# Patient Record
Sex: MALE | Race: WHITE | Employment: FULL TIME | RURAL
[De-identification: names, ages, dates, MRNs, and addresses within clinical notes are randomized per-mention and may not be internally consistent; named-entity substitution may affect disease eponyms.]

---

## 2019-10-15 ENCOUNTER — OFFICE VISIT (OUTPATIENT)
Dept: FAMILY MEDICINE CLINIC | Age: 69
End: 2019-10-15

## 2019-10-15 VITALS
SYSTOLIC BLOOD PRESSURE: 124 MMHG | RESPIRATION RATE: 16 BRPM | OXYGEN SATURATION: 99 % | HEIGHT: 72 IN | DIASTOLIC BLOOD PRESSURE: 63 MMHG | WEIGHT: 189 LBS | TEMPERATURE: 97.8 F | BODY MASS INDEX: 25.6 KG/M2 | HEART RATE: 64 BPM

## 2019-10-15 DIAGNOSIS — E03.9 ACQUIRED HYPOTHYROIDISM: ICD-10-CM

## 2019-10-15 DIAGNOSIS — Z28.21 INFLUENZA VACCINATION DECLINED: ICD-10-CM

## 2019-10-15 DIAGNOSIS — M25.511 CHRONIC RIGHT SHOULDER PAIN: ICD-10-CM

## 2019-10-15 DIAGNOSIS — E78.5 HYPERLIPIDEMIA, UNSPECIFIED HYPERLIPIDEMIA TYPE: ICD-10-CM

## 2019-10-15 DIAGNOSIS — H52.209 ASTIGMATISM, UNSPECIFIED LATERALITY, UNSPECIFIED TYPE: ICD-10-CM

## 2019-10-15 DIAGNOSIS — Z76.89 ENCOUNTER TO ESTABLISH CARE: ICD-10-CM

## 2019-10-15 DIAGNOSIS — R25.2 CRAMP OF BOTH LOWER EXTREMITIES: Primary | ICD-10-CM

## 2019-10-15 DIAGNOSIS — Z12.11 COLON CANCER SCREENING: ICD-10-CM

## 2019-10-15 DIAGNOSIS — G89.29 CHRONIC RIGHT SHOULDER PAIN: ICD-10-CM

## 2019-10-15 RX ORDER — LEVOTHYROXINE SODIUM 100 UG/1
100 TABLET ORAL
Refills: 0 | COMMUNITY
Start: 2019-08-19 | End: 2020-03-27 | Stop reason: SDUPTHER

## 2019-10-15 RX ORDER — DICLOFENAC SODIUM 10 MG/G
2 GEL TOPICAL 4 TIMES DAILY
Qty: 1 EACH | Refills: 1 | Status: SHIPPED | OUTPATIENT
Start: 2019-10-15 | End: 2020-12-08 | Stop reason: SDUPTHER

## 2019-10-15 NOTE — PROGRESS NOTES
Progress Note    Patient: Bridger Sharp MRN: <[id number]>  SSN: [id number]    YOB: 1950  Age: 81 y.o. Sex: male        Chief Complaint   Patient presents with    New Patient    Other     referral for lasik surgery         Subjective:     Problems addressed:  Encounter Diagnoses     ICD-10-CM ICD-9-CM   1. Cramp of both lower extremities R25.2 729.82   2. Chronic right shoulder pain M25.511 719.41    G89.29 338.29   3. Acquired hypothyroidism E03.9 244.9   4. Astigmatism, unspecified laterality, unspecified type H52.209 367.20   5. Hyperlipidemia, unspecified hyperlipidemia type E78.5 272.4   6. Encounter to establish care Z76.89 V65.8   7. Colon cancer screening Z12.11 V76.51   8. Influenza vaccination declined Z28.21 V64.06     80 y/o M presents to establish care. Recently moved here from [place] and got new insurance, signed medical records release form, will request prior records. Pt requests referral to eye doctor for Lasix due to astigmatism. Pt also states \"I'm old and wearing out\", complains of cramps in feet and calves over past few weeks which is a new problem, has not happened before, is drinking a lot of water and taking otc potassium without relief. Has chronic R shoulder pain without recent injury, has tried diclofenac gel in the past which helps, is using CBD for shoulder pain which made a little difference, works for [employer] and requests letter stating why he is taking CBD.      SH:   Social History     Tobacco Use    Smoking status: Former Smoker     Packs/day: 1.00     Years: 6.00     Pack years: 6.00     Types: Cigarettes    Smokeless tobacco: Never Used   Substance Use Topics    Alcohol use: Not Currently    Drug use: Not on file     FH:   Family History   Problem Relation Age of Onset    Alcohol abuse Mother     Dementia Mother     Suicide Father     Other Brother         rnp1 protein disorder with elevated inflamatory markers         Current and past medical information:    Current Medications after this visit[de-identified]     Current Outpatient Medications   Medication Sig    levothyroxine (SYNTHROID) 100 mcg tablet 100 mcg.  OTHER Hammer hemp 0.0%THC    diclofenac (VOLTAREN) 1 % gel Apply 2 g to affected area four (4) times daily. No current facility-administered medications for this visit. Patient Active Problem List    Diagnosis Date Noted    Astigmatism 10/17/2019    Hyperlipidemia 10/15/2019    Acquired hypothyroidism 10/15/2019    Chronic right shoulder pain 10/15/2019       Past Medical History:   Diagnosis Date    Depression     Hypercholesterolemia     Skin cancer     Thyroid disease        Allergies   Allergen Reactions    Atorvastatin Myalgia       Past Surgical History:   Procedure Laterality Date    HX HEENT  1998    subcutaneous cyst removed from head    HX HEENT  1999    skin cancer removed from face    HX TONSIL AND ADENOIDECTOMY               Review of Systems   Constitutional: Negative for chills and fever. HENT: Negative for congestion and sore throat. Eyes: Negative for blurred vision and double vision. Respiratory: Negative for cough and shortness of breath. Cardiovascular: Negative for chest pain. Gastrointestinal: Negative for abdominal pain, constipation, diarrhea, nausea and vomiting. Genitourinary: Negative for flank pain. Musculoskeletal: Positive for joint pain (R shoulder). Negative for back pain. Bilateral lower leg \"cramps\"   Skin: Negative for rash. Neurological: Negative for dizziness, tingling, sensory change and headaches. Endo/Heme/Allergies: Negative for environmental allergies. Psychiatric/Behavioral: Negative for substance abuse. Objective:     Vitals:    10/15/19 0819   BP: 124/63   Pulse: 64   Resp: 16   Temp: 97.8 °F (36.6 °C)   TempSrc: Oral   SpO2: 99%   Weight: 189 lb (85.7 kg)   Height: 5' 11.5\" (1.816 m)      Body mass index is 25.99 kg/m².       Physical Exam Constitutional: He appears well-developed and well-nourished. No distress. HENT:   Head: Normocephalic and atraumatic. Right Ear: External ear normal.   Left Ear: External ear normal.   Mouth/Throat: Oropharynx is clear and moist. No oropharyngeal exudate. Eyes: Pupils are equal, round, and reactive to light. Conjunctivae and EOM are normal.   Neck: Normal range of motion. Neck supple. Cardiovascular: Normal rate, regular rhythm and normal heart sounds. No murmur heard. Pulmonary/Chest: Effort normal and breath sounds normal. No respiratory distress. He has no wheezes. Abdominal: Soft. Bowel sounds are normal. He exhibits no distension. There is no tenderness. Musculoskeletal: He exhibits no edema or deformity. Right shoulder: He exhibits normal range of motion, no swelling, no deformity and normal strength. Lymphadenopathy:     He has no cervical adenopathy. Neurological: He is alert. Skin: Skin is warm and dry. No rash noted. He is not diaphoretic. No erythema. Psychiatric: He has a normal mood and affect. His behavior is normal.   Vitals reviewed. Health Maintenance Due   Topic Date Due    Hepatitis C Screening  1950    DTaP/Tdap/Td series (1 - Tdap) 01/23/1973    Shingrix Vaccine Age 50> (1 of 2) 01/23/2002    FOBT Q 1 YEAR AGE 50-75  01/23/2002    GLAUCOMA SCREENING Q2Y  01/23/2017    AAA Screening 73-67 YO Male Smoking Patients  01/23/2017    Pneumococcal 65+ years (1 of 2 - PCV13) 01/23/2017    Influenza Age 9 to Adult  08/01/2019       Assessment and orders:     Encounter Diagnoses     ICD-10-CM ICD-9-CM   1. Cramp of both lower extremities R25.2 729.82   2. Chronic right shoulder pain M25.511 719.41    G89.29 338.29   3. Acquired hypothyroidism E03.9 244.9   4. Astigmatism, unspecified laterality, unspecified type H52.209 367.20   5. Hyperlipidemia, unspecified hyperlipidemia type E78.5 272.4   6. Encounter to establish care Z76.89 V65.8   7.  Colon cancer screening Z12.11 V76.51   8. Influenza vaccination declined Z35.24 V64.06       82 y/o M establishing care with feet and calve cramping over past few weeks and multiple other complaints    1. Cramp of both lower extremities - new onset over a few weeks, pt drinking a lot of water and taking otc potassium supplement, brother has rnp1 protein disorder? No LE swelling or tenderness to palpation  - SED RATE (ESR)  - CRP, HIGH SENSITIVITY  - CMP  - Continue good PO hydration    2. Chronic right shoulder pain   - START diclofenac (VOLTAREN) 1 % gel; Apply 2 g to affected area four (4) times daily. Dispense: 1 Each; Refill: 1    3. Acquired hypothyroidism   - TSH 3RD GENERATION    4. Astigmatism   - REFERRAL TO OPHTHALMOLOGY    5. Hyperlipidemia, unspecified hyperlipidemia type - pt not on any medications at this time, requested prior records  -Lipid panel    6. Colon cancer screening  - pt is 81 and has never had a colonoscopy, says wife convinced him to get one  - REFERRAL TO GASTROENTEROLOGY    8. Influenza vaccination declined - pt says he doesn't believe in the flu or shingles vaccines           Plan of care:  Discussed diagnoses in detail with patient. Medication risks/benefits/side effects discussed with patient. All of the patient's questions were addressed. The patient understands and agrees with our plan of care. The patient knows to call back if they are unsure of or forget any changes we discussed today or if the symptoms change. The patient received an After-Visit Summary which contains VS, orders, medication list and allergy list. This can be used as a \"mini-medical record\" should they have to seek medical care while out of town. Follow-up and Dispositions    · Return in about 1 week (around 10/22/2019) for Lab Results, shoulder pain.          Future Appointments   Date Time Provider Shereen Chatman   10/29/2019  8:10 AM [facility]       Signed By: Marizol Mathews Casper Fields MD     October 17, 2019

## 2019-10-15 NOTE — PROGRESS NOTES
I saw and evaluated the patient, performing the key elements of the service. I discussed the findings, assessment and plan with the resident and agree with the resident's findings and plan as documented in the resident's note. Pt reports his brother is a physician and has MP1 protein disorder and recommended pt be screened with ESR and CRP.

## 2019-10-15 NOTE — PATIENT INSTRUCTIONS
Shoulder Stretches: Exercises  Introduction  Here are some examples of exercises for you to try. The exercises may be suggested for a condition or for rehabilitation. Start each exercise slowly. Ease off the exercises if you start to have pain. You will be told when to start these exercises and which ones will work best for you. How to do the exercises  Shoulder stretch    1.  a doorway and place one arm against the door frame. Your elbow should be a little higher than your shoulder. 2. Relax your shoulders as you lean forward, allowing your chest and shoulder muscles to stretch. You can also turn your body slightly away from your arm to stretch the muscles even more. 3. Hold for 15 to 30 seconds. 4. Repeat 2 to 4 times with each arm. Shoulder and chest stretch    1. Shoulder and chest stretch  2. While sitting, relax your upper body so you slump slightly in your chair. 3. As you breathe in, straighten your back and open your arms out to the sides. 4. Gently pull your shoulder blades back and downward. 5. Hold for 15 to 30 seconds as your breathe normally. 6. Repeat 2 to 4 times. Overhead stretch    1. Reach up over your head with both arms. 2. Hold for 15 to 30 seconds. 3. Repeat 2 to 4 times. Follow-up care is a key part of your treatment and safety. Be sure to make and go to all appointments, and call your doctor if you are having problems. It's also a good idea to know your test results and keep a list of the medicines you take. Where can you learn more? Go to http://frances-orestes.info/. Enter S254 in the search box to learn more about \"Shoulder Stretches: Exercises. \"  Current as of: June 26, 2019  Content Version: 12.2  © 2927-4073 [employer]. Care instructions adapted under license by [employer] (which disclaims liability or warranty for this information).  If you have questions about a medical condition or this instruction, always ask your healthcare professional. [place] [address] any warranty or liability for your use of this information.

## 2019-10-15 NOTE — LETTER
10/15/2019 9:55 AM 
 
Mr. Dalton CARLOS [address] [address] Patient is taking CBD for arthritis/joint pain which has helped to relieve his pain. Sincerely, Nisha Cazares MD

## 2019-10-15 NOTE — PROGRESS NOTES
1. Have you been to the ER, urgent care clinic, or been hospitalized since your last visit? No     2. Have you seen or consulted any other health care providers outside of the [address] [place] [address] since your last visit? No     Reviewed record in preparation for visit and have necessary documentation  Goals that were addressed and/or need to be completed during or after this appointment include   There are no preventive care reminders to display for this patient. I have received verbal consent from Segundo Upton to discuss any/all medical information while others present in the room.

## 2019-10-16 LAB
ALBUMIN SERPL-MCNC: 4.6 G/DL (ref 3.6–4.8)
ALBUMIN/GLOB SERPL: 2.1 {RATIO} (ref 1.2–2.2)
ALP SERPL-CCNC: 49 IU/L (ref 39–117)
ALT SERPL-CCNC: 20 IU/L (ref 0–44)
AST SERPL-CCNC: 26 IU/L (ref 0–40)
BASOPHILS # BLD AUTO: 0.1 X10E3/UL (ref 0–0.2)
BASOPHILS NFR BLD AUTO: 1 %
BILIRUB SERPL-MCNC: 0.5 MG/DL (ref 0–1.2)
BUN SERPL-MCNC: 20 MG/DL (ref 8–27)
BUN/CREAT SERPL: 21 (ref 10–24)
CALCIUM SERPL-MCNC: 9.4 MG/DL (ref 8.6–10.2)
CHLORIDE SERPL-SCNC: 101 MMOL/L (ref 96–106)
CHOLEST SERPL-MCNC: 257 MG/DL (ref 100–199)
CO2 SERPL-SCNC: 22 MMOL/L (ref 20–29)
CREAT SERPL-MCNC: 0.97 MG/DL (ref 0.76–1.27)
CRP SERPL HS-MCNC: 1.1 MG/L (ref 0–3)
EOSINOPHIL # BLD AUTO: 0.2 X10E3/UL (ref 0–0.4)
EOSINOPHIL NFR BLD AUTO: 2 %
ERYTHROCYTE [DISTWIDTH] IN BLOOD BY AUTOMATED COUNT: 12.7 % (ref 12.3–15.4)
ERYTHROCYTE [SEDIMENTATION RATE] IN BLOOD BY WESTERGREN METHOD: 2 MM/HR (ref 0–30)
GLOBULIN SER CALC-MCNC: 2.2 G/DL (ref 1.5–4.5)
GLUCOSE SERPL-MCNC: 94 MG/DL (ref 65–99)
HCT VFR BLD AUTO: 43.5 % (ref 37.5–51)
HDLC SERPL-MCNC: 49 MG/DL
HGB BLD-MCNC: 14.2 G/DL (ref 13–17.7)
IMM GRANULOCYTES # BLD AUTO: 0 X10E3/UL (ref 0–0.1)
IMM GRANULOCYTES NFR BLD AUTO: 0 %
LDLC SERPL CALC-MCNC: 182 MG/DL (ref 0–99)
LYMPHOCYTES # BLD AUTO: 1.8 X10E3/UL (ref 0.7–3.1)
LYMPHOCYTES NFR BLD AUTO: 27 %
MCH RBC QN AUTO: 30 PG (ref 26.6–33)
MCHC RBC AUTO-ENTMCNC: 32.6 G/DL (ref 31.5–35.7)
MCV RBC AUTO: 92 FL (ref 79–97)
MONOCYTES # BLD AUTO: 0.5 X10E3/UL (ref 0.1–0.9)
MONOCYTES NFR BLD AUTO: 8 %
NEUTROPHILS # BLD AUTO: 4.2 X10E3/UL (ref 1.4–7)
NEUTROPHILS NFR BLD AUTO: 62 %
PLATELET # BLD AUTO: 251 X10E3/UL (ref 150–450)
POTASSIUM SERPL-SCNC: 5 MMOL/L (ref 3.5–5.2)
PROT SERPL-MCNC: 6.8 G/DL (ref 6–8.5)
RBC # BLD AUTO: 4.73 X10E6/UL (ref 4.14–5.8)
SODIUM SERPL-SCNC: 140 MMOL/L (ref 134–144)
TRIGL SERPL-MCNC: 131 MG/DL (ref 0–149)
TSH SERPL DL<=0.005 MIU/L-ACNC: 1.01 UIU/ML (ref 0.45–4.5)
VLDLC SERPL CALC-MCNC: 26 MG/DL (ref 5–40)
WBC # BLD AUTO: 6.7 X10E3/UL (ref 3.4–10.8)

## 2019-10-17 PROBLEM — H52.209 ASTIGMATISM: Status: ACTIVE | Noted: 2019-10-17

## 2019-10-25 NOTE — PROGRESS NOTES
Results noted. , ASCVD risk score 16.4. Not currently on treatment for HLD. Pt had myalgias with atorvastatin. Will contact patient and see if he is willing to try rosuvastatin.  ESR, CRP, and TSH normal.     Nathaniel Mooney MD  PGY2 Family Medicine Resident

## 2019-10-26 ENCOUNTER — TELEPHONE (OUTPATIENT)
Dept: FAMILY MEDICINE CLINIC | Age: 69
End: 2019-10-26

## 2019-10-26 NOTE — TELEPHONE ENCOUNTER
Attempted to call patient about lab results. No answer. All labs normal except for high cholesterol. Due to patient's high ASCVD risk score of 16.4, high intensity statin is recommended. Will discuss options with patient at his clinic visit in 3 days on 10/29/19.      Bob Garces MD  4:10 PM

## 2019-10-29 ENCOUNTER — OFFICE VISIT (OUTPATIENT)
Dept: FAMILY MEDICINE CLINIC | Age: 69
End: 2019-10-29

## 2019-10-29 VITALS
RESPIRATION RATE: 16 BRPM | WEIGHT: 192 LBS | DIASTOLIC BLOOD PRESSURE: 77 MMHG | BODY MASS INDEX: 26.01 KG/M2 | TEMPERATURE: 97.6 F | SYSTOLIC BLOOD PRESSURE: 125 MMHG | HEART RATE: 67 BPM | HEIGHT: 72 IN | OXYGEN SATURATION: 98 %

## 2019-10-29 DIAGNOSIS — Z87.891 HISTORY OF SMOKING: ICD-10-CM

## 2019-10-29 DIAGNOSIS — E03.9 ACQUIRED HYPOTHYROIDISM: ICD-10-CM

## 2019-10-29 DIAGNOSIS — Z11.59 NEED FOR HEPATITIS C SCREENING TEST: ICD-10-CM

## 2019-10-29 DIAGNOSIS — G89.29 CHRONIC RIGHT SHOULDER PAIN: ICD-10-CM

## 2019-10-29 DIAGNOSIS — Z28.21 INFLUENZA VACCINATION DECLINED: ICD-10-CM

## 2019-10-29 DIAGNOSIS — Z13.6 ENCOUNTER FOR ABDOMINAL AORTIC ANEURYSM (AAA) SCREENING: ICD-10-CM

## 2019-10-29 DIAGNOSIS — E78.5 HYPERLIPIDEMIA, UNSPECIFIED HYPERLIPIDEMIA TYPE: Primary | ICD-10-CM

## 2019-10-29 DIAGNOSIS — M25.511 CHRONIC RIGHT SHOULDER PAIN: ICD-10-CM

## 2019-10-29 DIAGNOSIS — Z28.21 PNEUMOCOCCAL VACCINATION DECLINED: ICD-10-CM

## 2019-10-29 RX ORDER — EZETIMIBE 10 MG/1
10 TABLET ORAL DAILY
Qty: 30 TAB | Refills: 2 | Status: SHIPPED | OUTPATIENT
Start: 2019-10-29 | End: 2020-09-09

## 2019-10-29 RX ORDER — ROSUVASTATIN CALCIUM 10 MG/1
10 TABLET, COATED ORAL
Qty: 30 TAB | Refills: 0 | Status: CANCELLED | OUTPATIENT
Start: 2019-10-29

## 2019-10-29 NOTE — PROGRESS NOTES
Progress Note    Patient: Margarita Gonzales MRN: [id number]  SSN: [id number]    YOB: 1950  Age: 81 y.o. Sex: male        Chief Complaint   Patient presents with    Results     lab result follow up         Subjective:     Problems addressed:  Encounter Diagnoses     ICD-10-CM ICD-9-CM   1. Hyperlipidemia, unspecified hyperlipidemia type E78.5 272.4   2. Chronic right shoulder pain M25.511 719.41    G89.29 338.29   3. Acquired hypothyroidism E03.9 244.9   4. History of smoking Z87. 891 V15.82   5. Need for hepatitis C screening test Z11.59 V73.89   6. Influenza vaccination declined Z28.21 V64.06   7. Pneumococcal vaccination declined Z28.21 V64.06   8. Encounter for abdominal aortic aneurysm (AAA) screening Z13.6 V81.2       80 y/o M presents for follow up of lab results and shoulder pain. ASCVD risk score of 16.4 with LDL of 182. Otherwise labs looked good, thyroid nl function, ESR and CRP nl. Pt has tried 3-4 statins in the past and had myalgias with each, declines to try any statin again. Says was on Zetia in the past and did ok with that. He also says he is planning to try keto soon to lose some weight. Shoulder pain is improved with diclofenac gel and described as \"managable\". Pt has no other complaints at this time. Current and past medical information:    Current Medications after this visit[de-identified]     Current Outpatient Medications   Medication Sig    ezetimibe (ZETIA) 10 mg tablet Take 1 Tab by mouth daily.  levothyroxine (SYNTHROID) 100 mcg tablet 100 mcg.  diclofenac (VOLTAREN) 1 % gel Apply 2 g to affected area four (4) times daily.  OTHER Hammer hemp 0.0%THC     No current facility-administered medications for this visit.         Patient Active Problem List    Diagnosis Date Noted    Former smoker 10/29/2019    Astigmatism 10/17/2019    Hyperlipidemia 10/15/2019    Acquired hypothyroidism 10/15/2019    Chronic right shoulder pain 10/15/2019       Past Medical History:   Diagnosis Date    Depression     Hypercholesterolemia     Skin cancer     Thyroid disease        Allergies   Allergen Reactions    Atorvastatin Myalgia    Crestor [Rosuvastatin] Myalgia       Past Surgical History:   Procedure Laterality Date    HX HEENT  1998    subcutaneous cyst removed from head    HX HEENT  1999    skin cancer removed from face    HX TONSIL AND ADENOIDECTOMY         Social History     Tobacco Use    Smoking status: Former Smoker     Packs/day: 1.00     Years: 6.00     Pack years: 6.00     Types: Cigarettes    Smokeless tobacco: Never Used   Substance Use Topics    Alcohol use: Not Currently    Drug use: Not on file         Review of Systems   Constitutional: Negative for chills and fever. Respiratory: Negative for cough and shortness of breath. Cardiovascular: Negative for chest pain and palpitations. Musculoskeletal: Positive for joint pain (R shoulder). Negative for falls. Neurological: Negative for sensory change. Objective:     Vitals:    10/29/19 0814   BP: 125/77   Pulse: 67   Resp: 16   Temp: 97.6 °F (36.4 °C)   TempSrc: Oral   SpO2: 98%   Weight: 192 lb (87.1 kg)   Height: 5' 11.5\" (1.816 m)      Body mass index is 26.41 kg/m². Physical Exam   Constitutional: He appears well-developed and well-nourished. No distress. HENT:   Head: Normocephalic and atraumatic. Right Ear: External ear normal.   Left Ear: External ear normal.   Eyes: Pupils are equal, round, and reactive to light. Conjunctivae are normal.   Neck: Neck supple. No thyromegaly present. Cardiovascular: Normal rate, regular rhythm and normal heart sounds. No murmur heard. Pulmonary/Chest: Effort normal and breath sounds normal. No respiratory distress. He has no wheezes. Musculoskeletal: He exhibits no edema or deformity. Neurological: He is alert. Skin: Skin is warm and dry. He is not diaphoretic. No erythema. Psychiatric: He has a normal mood and affect.  His behavior is normal. Vitals reviewed. Health Maintenance Due   Topic Date Due    DTaP/Tdap/Td series (1 - Tdap) 01/23/1973    Shingrix Vaccine Age 50> (1 of 2) 01/23/2002    FOBT Q 1 YEAR AGE 50-75  01/23/2002    GLAUCOMA SCREENING Q2Y  01/23/2017    AAA Screening 73-69 YO Male Smoking Patients  01/23/2017    Pneumococcal 65+ years (1 of 2 - PCV13) 01/23/2017    Influenza Age 9 to Adult  08/01/2019    MEDICARE YEARLY EXAM  10/30/2019       Assessment and orders:     Encounter Diagnoses     ICD-10-CM ICD-9-CM   1. Hyperlipidemia, unspecified hyperlipidemia type E78.5 272.4   2. Chronic right shoulder pain M25.511 719.41    G89.29 338.29   3. Acquired hypothyroidism E03.9 244.9   4. History of smoking Z87. 891 V15.82   5. Need for hepatitis C screening test Z11.59 V73.89   6. Influenza vaccination declined Z28.21 V64.06   7. Pneumococcal vaccination declined Z28.21 V64.06   8. Encounter for abdominal aortic aneurysm (AAA) screening Z13.6 V81.2       80 y/o M with hyperlipidemia and statin allergy, ok to start Zetia    1. Hyperlipidemia, unspecified hyperlipidemia type - CYX119, pt allergic to statins, has tried 3-4 different ones in the past  -START ezetimibe (ZETIA) 10 mg tablet; Take 1 Tab by mouth daily. Dispense: 30 Tab; Refill: 2  -Encouraging healthy diet and exercise    2. Chronic right shoulder pain - improved and \"manageable\" with diclofenac gel  -Continue diclofenac gel PRN as needed for pain    3. Acquired hypothyroidism - TSH 1.010  -Continue synthroid 100 mcg daily    4. History of smoking - about 6 pack years  - US EXAM SCREENING AAA; Future    5. Need for hepatitis C screening test - born   - HEPATITIS C AB    6. Influenza vaccination declined    7. Pneumococcal vaccination declined          Plan of care:  Discussed diagnoses in detail with patient. Medication risks/benefits/side effects discussed with patient. All of the patient's questions were addressed.  The patient understands and agrees with our plan of care. The patient knows to call back if they are unsure of or forget any changes we discussed today or if the symptoms change. The patient received an After-Visit Summary which contains VS, orders, medication list and allergy list. This can be used as a \"mini-medical record\" should they have to seek medical care while out of town. Follow-up and Dispositions    · Return in about 3 months (around 1/29/2020) for Chronic Medical Problems. No future appointments.     Signed By: Christopher Dasilva MD     October 29, 2019

## 2019-10-29 NOTE — PROGRESS NOTES
1. Have you been to the ER, urgent care clinic, or been hospitalized since your last visit? No     2. Have you seen or consulted any other health care providers outside of the [address] [place] [address] since your last visit?   no    Reviewed record in preparation for visit and have necessary documentation  Goals that were addressed and/or need to be completed during or after this appointment include   Health Maintenance Due   Topic Date Due    Hepatitis C Screening  1950    DTaP/Tdap/Td series (1 - Tdap) 01/23/1973    Shingrix Vaccine Age 50> (1 of 2) 01/23/2002    FOBT Q 1 YEAR AGE 50-75  01/23/2002    GLAUCOMA SCREENING Q2Y  01/23/2017    AAA Screening 73-67 YO Male Smoking Patients  01/23/2017    Pneumococcal 65+ years (1 of 2 - PCV13) 01/23/2017    Influenza Age 5 to Adult  08/01/2019       I have received verbal consent from Elzie Crigler to discuss any/all medical information while others present in the room.

## 2019-10-29 NOTE — PATIENT INSTRUCTIONS
An appointment has been made for you to see Dr. Karlo Warner, eye specialist, on November 5, 2019 at 10:15 with an arrival time of 10:00. The office is located at [address] [place] [address] [place], . [address] and the number to the office is [phone]. Letter to patient and records sent electronically. Information has been faxed to [facility] for their office to contact the patient to schedule an appointment. The phone number to the office is [phone]. Letter to patient. High Cholesterol: Care Instructions  Your Care Instructions    Cholesterol is a type of fat in your blood. It is needed for many body functions, such as making new cells. Cholesterol is made by your body. It also comes from food you eat. High cholesterol means that you have too much of the fat in your blood. This raises your risk of a heart attack and stroke. LDL and HDL are part of your total cholesterol. LDL is the \"bad\" cholesterol. High LDL can raise your risk for heart disease, heart attack, and stroke. HDL is the \"good\" cholesterol. It helps clear bad cholesterol from the body. High HDL is linked with a lower risk of heart disease, heart attack, and stroke. Your cholesterol levels help your doctor find out your risk for having a heart attack or stroke. You and your doctor can talk about whether you need to lower your risk and what treatment is best for you. A heart-healthy lifestyle along with medicines can help lower your cholesterol and your risk. The way you choose to lower your risk will depend on how high your risk is for heart attack and stroke. It will also depend on how you feel about taking medicines. Follow-up care is a key part of your treatment and safety. Be sure to make and go to all appointments, and call your doctor if you are having problems. It's also a good idea to know your test results and keep a list of the medicines you take. How can you care for yourself at home?   · Eat a variety of foods every day. Good choices include fruits, vegetables, whole grains (like oatmeal), dried beans and peas, nuts and seeds, soy products (like tofu), and fat-free or low-fat dairy products. · Replace butter, margarine, and hydrogenated or partially hydrogenated oils with olive and canola oils. (Canola oil margarine without trans fat is fine.)  · Replace red meat with fish, poultry, and soy protein (like tofu). · Limit processed and packaged foods like chips, crackers, and cookies. · Bake, broil, or steam foods. Don't lind them. · Be physically active. Get at least 30 minutes of exercise on most days of the week. Walking is a good choice. You also may want to do other activities, such as running, swimming, cycling, or playing tennis or team sports. · Stay at a healthy weight or lose weight by making the changes in eating and physical activity listed above. Losing just a small amount of weight, even 5 to 10 pounds, can reduce your risk for having a heart attack or stroke. · Do not smoke. When should you call for help? Watch closely for changes in your health, and be sure to contact your doctor if:    · You need help making lifestyle changes.     · You have questions about your medicine. Where can you learn more? Go to http://frances-orestes.info/. Enter N610 in the search box to learn more about \"High Cholesterol: Care Instructions. \"  Current as of: April 9, 2019  Content Version: 12.2  © 9598-7195 [employer]. Care instructions adapted under license by [employer] (which disclaims liability or warranty for this information). If you have questions about a medical condition or this instruction, always ask your healthcare professional. [address] any warranty or liability for your use of this information.

## 2019-10-30 LAB — HCV AB S/CO SERPL IA: <0.1 S/CO RATIO (ref 0–0.9)

## 2019-10-30 NOTE — PROGRESS NOTES
Results noted. No further actions required.      Melissa Goldstein MD  PGY2 Family Medicine Resident

## 2019-11-06 ENCOUNTER — TELEPHONE (OUTPATIENT)
Dept: FAMILY MEDICINE CLINIC | Age: 69
End: 2019-11-06

## 2020-03-26 ENCOUNTER — OFFICE VISIT (OUTPATIENT)
Dept: FAMILY MEDICINE CLINIC | Age: 70
End: 2020-03-26

## 2020-03-26 VITALS
HEART RATE: 67 BPM | BODY MASS INDEX: 26.68 KG/M2 | HEIGHT: 72 IN | SYSTOLIC BLOOD PRESSURE: 120 MMHG | DIASTOLIC BLOOD PRESSURE: 75 MMHG | RESPIRATION RATE: 20 BRPM | TEMPERATURE: 98.5 F | WEIGHT: 197 LBS | OXYGEN SATURATION: 95 %

## 2020-03-26 DIAGNOSIS — B02.9 HERPES ZOSTER WITHOUT COMPLICATION: Primary | ICD-10-CM

## 2020-03-26 DIAGNOSIS — B07.8 COMMON WART: ICD-10-CM

## 2020-03-26 RX ORDER — FAMCICLOVIR 500 MG/1
500 TABLET ORAL 3 TIMES DAILY
Qty: 21 TAB | Refills: 0 | Status: SHIPPED | OUTPATIENT
Start: 2020-03-26 | End: 2020-04-02

## 2020-03-26 NOTE — PROGRESS NOTES
[facility]    Subjective:   Beatriz Abdalla is a 78 y.o. male with history of hypothyroidism, HLD, former tobacco use  CC: possible shingles and wart  History provided by patient    HPI:  Pt complains of a wart located on his left third finger. It becomes painful if he continues to let it grow. He has had this frozen off three other times. He has had this wart for many years, and it keeps coming back. Pt states that his wife believes that he has shingles. He thought it was a bug bite. The site has been sore for about a week. He then thought he was stung. It is unclear if it is spreading or getting worse. He did not see a bug bite him. It is located on the right side of his back at waist level. It has hurt him to lay down. It is mildly pruritic. PFSH:     Current Outpatient Medications on File Prior to Visit   Medication Sig Dispense Refill    OTHER Hammer hemp 0.0%THC      ezetimibe (ZETIA) 10 mg tablet Take 1 Tab by mouth daily. 30 Tab 2    levothyroxine (SYNTHROID) 100 mcg tablet 100 mcg.  0    diclofenac (VOLTAREN) 1 % gel Apply 2 g to affected area four (4) times daily. 1 Each 1     No current facility-administered medications on file prior to visit.         Patient Active Problem List   Diagnosis Code    Hyperlipidemia E78.5    Acquired hypothyroidism E03.9    Chronic right shoulder pain M25.511, G89.29    Astigmatism H52.209    Former smoker Z87.891       Social History     Socioeconomic History    Marital status:      Spouse name: Not on file    Number of children: Not on file    Years of education: Not on file    Highest education level: Not on file   Occupational History    Not on file   Social Needs    Financial resource strain: Not on file    Food insecurity     Worry: Not on file     Inability: Not on file    Transportation needs     Medical: Not on file     Non-medical: Not on file   Tobacco Use    Smoking status: Former Smoker     Packs/day: 1.00 Years: 6.00     Pack years: 6.00     Types: Cigarettes    Smokeless tobacco: Never Used   Substance and Sexual Activity    Alcohol use: Not Currently    Drug use: Not on file    Sexual activity: Yes     Partners: Female   Lifestyle    Physical activity     Days per week: Not on file     Minutes per session: Not on file    Stress: Not on file   Relationships    Social connections     Talks on phone: Not on file     Gets together: Not on file     Attends Evangelical service: Not on file     Active member of club or organization: Not on file     Attends meetings of clubs or organizations: Not on file     Relationship status: Not on file    Intimate partner violence     Fear of current or ex partner: Not on file     Emotionally abused: Not on file     Physically abused: Not on file     Forced sexual activity: Not on file   Other Topics Concern    Not on file   Social History Narrative    Not on file       Review of Systems   Constitutional: Negative for chills and fever. Skin: Positive for itching and rash. Burning sensation. Positive for wart   Neurological: Positive for tingling. Objective:     Visit Vitals  /75 (BP 1 Location: Right arm, BP Patient Position: Sitting)   Pulse 67   Temp 98.5 °F (36.9 °C) (Oral)   Resp 20   Ht 5' 11.5\" (1.816 m)   Wt 197 lb (89.4 kg)   SpO2 95%   BMI 27.09 kg/m²          Physical Exam  Constitutional:       Appearance: Normal appearance. HENT:      Head: Normocephalic and atraumatic. Skin:     General: Skin is warm. Findings: Rash present. Comments: Cluster of vesicles, not yet ruptured in dermatomal pattern on right side around level of L4. Wart located on R third, dorsal finger immediately proximal to cuticle. Neurological:      Mental Status: He is alert.        Clinic Procedure Note:    Procedure performed: Cryotherapy of common wart     Indications: Irritating wart/Cosmetic    Date of procedure: 03/27/20    Chart reviewed for the following:              Joan Sheth MD, have reviewed the History, Physical and updated the Allergic reactions for [address] performed immediately prior to start of procedure:              Joan Sheth MD, have performed the following reviews on [facility] prior to the start of the procedure, see attached form in media. Procedure:  After consent was obtained, a common wart was identified for destruction. Lesion was treated to 4 rounds of cryotherapy, both with five seconds of treatment. Total number of lesions treated:  one. Locations of Treatment performed: periungal region of right, third finger  The procedure was well tolerated. The patient is advised that typically noting swelling and mild discoloration of the lesions over the course of 24-48 hours initially and nadia the lesions should eventually darken and fall off over the course of 7-14 days. A small amount of bleeding can be normal.    Advised if develops drainage, worse selling, new swelling after original swelling or other significant abnormalities to call or return to office immediatly. Procedure performed by:  Abril Daley MD  Provider assisted by: Julianne Barrios  Patient assisted by: self     How tolerated by patient: tolerated the procedure well with no complications    Comments: none      Pertinent Labs/Studies: none      Assessment and orders:       ICD-10-CM ICD-9-CM    1. Herpes zoster without complication S02.5 418.3    2. Common wart B07.8 078.19 DESTRUC BENIGN LESION, UP TO 14 LESIONS     Encounter Diagnoses   Name Primary?  Herpes zoster without complication Yes    Common wart      Diagnoses and all orders for this visit:    1. Herpes zoster without complication - pt has not had shingles vaccine. Lesions are characteristic of shingles and located in one dermatome on R-sided back around L4. Vesicles have not yet ruptured as there is no crusting over the lesions.  Rash present for more than 72 hours, but as new lesions are appearing, and they have not crusted, pt will likely get some benefit from taking antiviral.  -     famciclovir (FAMVIR) 500 mg tablet; Take 1 Tab by mouth three (3) times daily for 7 days.  -     Recommended pt to get shingles vaccine about 2-3 months after this rash resolves  -     Asked pt to call if his symptoms become severe or if his pain is not managed by tylenol or motrin    2. Common wart - pt has had common wart that has recurred on his R third distal phalanx. He has already had 3 cryotherapy treatments. Gave additional round of cryotherapy given its recurrence. See procedure note above. -     DESTRUC BENIGN LESION, UP TO 14 LESIONS        Follow-up and Dispositions    · Return if symptoms worsen or fail to improve. I have discussed the diagnosis with the patient and the intended plan as seen in the above orders. Social history, medical history, and labs were reviewed. The patient has received an after-visit summary and questions were answered concerning future plans. I have discussed medication side effects and warnings with the patient as well.     Maribell Cunningham MD  Resident [facility]  03/26/20

## 2020-03-26 NOTE — PATIENT INSTRUCTIONS

## 2020-03-26 NOTE — PROGRESS NOTES
I discussed the findings, assessment and plan in detail with the resident and agree with the resident's findings and plan as documented in the resident's note. Deniz Delvalle M.D.

## 2020-03-26 NOTE — PROGRESS NOTES
1. Have you been to the ER, urgent care clinic since your last visit? Hospitalized since your last visit? No    2. Have you seen or consulted any other health care providers outside of the [address] [place] [address] since your last visit? Include any pap smears or colon screening. No    Reviewed record in preparation for visit and have necessary documentation  Goals that were addressed and/or need to be completed during or after this appointment include     Health Maintenance Due   Topic Date Due    DTaP/Tdap/Td series (1 - Tdap) 01/23/1973    Shingrix Vaccine Age 50> (1 of 2) 01/23/2002    FOBT Q1Y Age 54-65  01/23/2002    GLAUCOMA SCREENING Q2Y  01/23/2017    AAA Screening 73-67 YO Male Smoking Patients  01/23/2017    Pneumococcal 65+ years (1 of 1 - PPSV23) 01/23/2017    Influenza Age 5 to Adult  08/01/2019    Medicare Yearly Exam  10/30/2019       Patient is accompanied by self I have received verbal consent from Ag Leslie to discuss any/all medical information while they are present in the room.

## 2020-03-27 ENCOUNTER — PATIENT MESSAGE (OUTPATIENT)
Dept: FAMILY MEDICINE CLINIC | Age: 70
End: 2020-03-27

## 2020-03-27 DIAGNOSIS — E03.9 ACQUIRED HYPOTHYROIDISM: Primary | ICD-10-CM

## 2020-03-27 RX ORDER — LEVOTHYROXINE SODIUM 100 UG/1
100 TABLET ORAL
Qty: 90 TAB | Refills: 1 | Status: SHIPPED | OUTPATIENT
Start: 2020-03-27 | End: 2020-03-27 | Stop reason: ALTCHOICE

## 2020-03-27 NOTE — TELEPHONE ENCOUNTER
From: Kamini Boogie  To: Kailey Self MD  Sent: 3/27/2020 12:51 PM EDT  Subject: Prescription Question    Dr Woo:  If possible would prefer Arnett Thyroid instead of the levrothyroxine. My ORIGINAL Rx was for Arnett Thyroid, and levrothyroxine was substituted.     Thanks  Avni Dawn

## 2020-03-27 NOTE — TELEPHONE ENCOUNTER
Sent a replacement prescription for [place]om Thyroid for the patient, but I do not know if insurance will cover or not.      Adela Mtz MD  1:21 PM

## 2020-04-23 DIAGNOSIS — E03.9 ACQUIRED HYPOTHYROIDISM: ICD-10-CM

## 2020-04-23 RX ORDER — THYROID,PORK 97.5 MG
TABLET ORAL
Qty: 30 TAB | Refills: 0 | Status: SHIPPED | OUTPATIENT
Start: 2020-04-23 | End: 2020-07-06

## 2020-04-24 ENCOUNTER — PATIENT MESSAGE (OUTPATIENT)
Dept: FAMILY MEDICINE CLINIC | Age: 70
End: 2020-04-24

## 2020-04-24 DIAGNOSIS — D22.9 ATYPICAL MOLE: ICD-10-CM

## 2020-04-24 DIAGNOSIS — Z85.828 HISTORY OF SKIN CANCER: Primary | ICD-10-CM

## 2020-04-24 NOTE — TELEPHONE ENCOUNTER
Marybeth Sykes, MAR [birth year] 3:06 PM EDT      ----- Message -----  From: Yara Rader  Sent: 4/24/2020 12:24 PM EDT  To: [facility] Nurse Pool  Subject: Visit Follow-Up Question     Thank you for prompt treatment. Shingles appears to have run it's course & cleared up.  -  Next issue is \"suspicious moles\" on my back. I do have a history of skin cancer. When might I get this checked out & removed. -  Can we schedule an early AM appointment.  Thanks  Odette Agustin

## 2020-06-25 ENCOUNTER — TELEPHONE (OUTPATIENT)
Dept: FAMILY MEDICINE CLINIC | Age: 70
End: 2020-06-25

## 2020-06-25 NOTE — TELEPHONE ENCOUNTER
----- Message from Eric Dos Rios sent at 6/25/2020  9:22 AM EDT -----  Regarding: Dr. Siomara Mccullough: [phone]  Caller's first and last name: N/A  Reason for call:  Pt is requesting in office visit, addressing several issues: f/up, lyme disease check up has had tick bites, does not feel well, allergies, sinus issues, swollen glands. Callback required yes/no and why: Yes, to inform.    Best contact number(s): [phone]  Details to clarify the request: N/A

## 2020-06-25 NOTE — TELEPHONE ENCOUNTER
Attempted to call. No answer. Message left. If patient is having cold/allergy symptoms/ cough, we cannot see patient in office.  Virtual appointment must be made

## 2020-09-04 ENCOUNTER — TELEPHONE (OUTPATIENT)
Dept: FAMILY MEDICINE CLINIC | Age: 70
End: 2020-09-04

## 2020-09-04 NOTE — TELEPHONE ENCOUNTER
Called pt to schedule VV w/ provider and pt can discuss any concerns during VV regarding coming into office for an appt. No answer. Left message to call back.

## 2020-09-08 ENCOUNTER — OFFICE VISIT (OUTPATIENT)
Dept: FAMILY MEDICINE CLINIC | Age: 70
End: 2020-09-08
Payer: COMMERCIAL

## 2020-09-08 VITALS
OXYGEN SATURATION: 98 % | DIASTOLIC BLOOD PRESSURE: 71 MMHG | HEIGHT: 72 IN | SYSTOLIC BLOOD PRESSURE: 121 MMHG | RESPIRATION RATE: 16 BRPM | BODY MASS INDEX: 26.14 KG/M2 | WEIGHT: 193 LBS | HEART RATE: 63 BPM | TEMPERATURE: 97.2 F

## 2020-09-08 DIAGNOSIS — Z12.11 COLON CANCER SCREENING: ICD-10-CM

## 2020-09-08 DIAGNOSIS — E78.5 HYPERLIPIDEMIA, UNSPECIFIED HYPERLIPIDEMIA TYPE: Primary | ICD-10-CM

## 2020-09-08 DIAGNOSIS — E03.9 ACQUIRED HYPOTHYROIDISM: ICD-10-CM

## 2020-09-08 DIAGNOSIS — R42 VERTIGO: ICD-10-CM

## 2020-09-08 DIAGNOSIS — B07.8 COMMON WART: ICD-10-CM

## 2020-09-08 DIAGNOSIS — H91.93 BILATERAL HEARING LOSS, UNSPECIFIED HEARING LOSS TYPE: ICD-10-CM
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
## 2020-09-08 DIAGNOSIS — D22.9 BENIGN MOLE: ICD-10-CM

## 2020-09-08 DIAGNOSIS — R42 DIZZINESS: ICD-10-CM

## 2020-09-08 DIAGNOSIS — Z85.828 HISTORY OF SKIN CANCER: ICD-10-CM

## 2020-09-08 LAB
CHOLEST SERPL-MCNC: 220 MG/DL
HDLC SERPL-MCNC: 56 MG/DL
HDLC SERPL: 3.9 {RATIO} (ref 0–5)
LDLC SERPL CALC-MCNC: 141.2 MG/DL (ref 0–100)
LIPID PROFILE,FLP: ABNORMAL
TRIGL SERPL-MCNC: 114 MG/DL (ref ?–150)
TSH SERPL DL<=0.05 MIU/L-ACNC: 2.24 UIU/ML (ref 0.36–3.74)
VLDLC SERPL CALC-MCNC: 22.8 MG/DL

## 2020-09-08 PROCEDURE — 99214 OFFICE O/P EST MOD 30 MIN: CPT | Performed by: STUDENT IN AN ORGANIZED HEALTH CARE EDUCATION/TRAINING PROGRAM

## 2020-09-08 NOTE — PROGRESS NOTES
[address] Medicine Residency Attending Addendum:  Dr. Colt Vivas MD,  the patient and I were not physically present during this encounter. The resident and I are concurrently monitoring the patient care through appropriate telecommunication technology. I discussed the findings, assessment and plan with the resident and agree with the resident's findings and plan as documented in the resident's note.       Braulio Keith MD

## 2020-09-08 NOTE — PROGRESS NOTES
Progress Note    Patient: Delfino Casey MRN: [id number]  SSN: [id number]    YOB: 1950  Age: 78 y.o. Sex: male        Chief Complaint   Patient presents with    Skin Problem     moles on back and spots on arms    Warts    Labs         Subjective:     Problems addressed:  Encounter Diagnoses     ICD-10-CM ICD-9-CM   1. Acquired hypothyroidism  E03.9 244.9   2. Hyperlipidemia, unspecified hyperlipidemia type  E78.5 272.4   3. Benign mole  D22.9 216.9   4. Tick bite, sequela  W57. XXXS 906.2   5. History of skin cancer  Z85.828 V10.83   6. Common wart  B07.8 078.19   7. Dizziness  R42 780.4   8. Bilateral hearing loss, unspecified hearing loss type  H91.93 389.9   9. Vertigo  R42 780.4   10. Colon cancer screening  Z12.11 V76.51       HPI: 78 y.o. y/o male with PMH of hypothyroidism and HLD presents for multiple complaints. Pt reports multiple tick bites this summer, has noticed a red circular rash about 2-3 cm after one of the bites, but denies target like rash, and requests Lyme testing. Pt reports episode of dizziness about a week ago, went to [facility] ER, had normal basic labs and vitals and was diagnosed with vertigo. Pt denies any dizziness since, but would like ENT referral for vertigo and says his wife thinks he has some hearing loss. Reports a couple moles on his back that he would like a Dermatologist to look at, reports hx of skin cancer on his forehead in the past and wants to establish care with Dermatologist. Pt due for labs. Says he is not taking the Zetia due to occasional muscle cramps. Denies any fever, chills, cough, SOB, sick contacts, or recent travel. Pt requests another referral to GI to get Colonoscopy set up. Pt refuses vaccinations today. Current and past medical information:    Current Medications after this visit[de-identified]     Current Outpatient Medications   Medication Sig    salicylic acid 17 % topical gel Soak wart in warm water for 5 minutes. Dry area thoroughly. Apply 1 drop to cover wart. Let dry. Repeat once or twice daily until wart is removed for up to 12 weeks.  Nature-Throid 97.5 mg tab TAKE ONE TABLET BY MOUTH EVERY DAY    OTHER Hammer hemp 0.0%THC    diclofenac (VOLTAREN) 1 % gel Apply 2 g to affected area four (4) times daily. No current facility-administered medications for this visit. Patient Active Problem List    Diagnosis Date Noted    Former smoker 10/29/2019    Astigmatism 10/17/2019    Hyperlipidemia 10/15/2019    Acquired hypothyroidism 10/15/2019    Chronic right shoulder pain 10/15/2019       Past Medical History:   Diagnosis Date    Depression     Hypercholesterolemia     Skin cancer     Thyroid disease        Allergies   Allergen Reactions    Atorvastatin Myalgia    Crestor [Rosuvastatin] Myalgia       Past Surgical History:   Procedure Laterality Date    HX HEENT  1998    subcutaneous cyst removed from head    HX HEENT  1999    skin cancer removed from face    HX TONSIL AND ADENOIDECTOMY         Social History     Tobacco Use    Smoking status: Former Smoker     Packs/day: 1.00     Years: 6.00     Pack years: 6.00     Types: Cigarettes    Smokeless tobacco: Never Used   Substance Use Topics    Alcohol use: Not Currently    Drug use: Not on file         Review of Systems   Constitutional: Negative for chills and fever. HENT: Negative for congestion and sore throat. Respiratory: Negative for cough and shortness of breath. Gastrointestinal: Negative for abdominal pain, nausea and vomiting. Musculoskeletal: Negative for falls. Neurological: Negative for sensory change, speech change, focal weakness, loss of consciousness, weakness and headaches. Objective:     Vitals:    09/08/20 0856   BP: 121/71   Pulse: 63   Resp: 16   Temp: 97.2 °F (36.2 °C)   TempSrc: Temporal   SpO2: 98%   Weight: 193 lb (87.5 kg)   Height: 5' 11.5\" (1.816 m)      Body mass index is 26.54 kg/m².       Physical Exam  Constitutional: General: He is not in acute distress. Appearance: Normal appearance. He is not diaphoretic. HENT:      Head: Normocephalic and atraumatic. Right Ear: External ear normal.      Left Ear: External ear normal.   Eyes:      Conjunctiva/sclera: Conjunctivae normal.      Pupils: Pupils are equal, round, and reactive to light. Neck:      Musculoskeletal: Neck supple. Cardiovascular:      Rate and Rhythm: Normal rate and regular rhythm. Heart sounds: No murmur. Pulmonary:      Effort: Pulmonary effort is normal. No respiratory distress. Breath sounds: No wheezing or rhonchi. Skin:     General: Skin is warm and dry. Findings: No erythema or rash. Comments: Patient with 3 0.5-1cm moles on his back, all uniform color without ulceration, clear borders, and symmetrical. Also with 1 wart on R 3rd finger. Neurological:      General: No focal deficit present. Mental Status: He is alert. Psychiatric:         Behavior: Behavior normal.            Assessment and orders:     Encounter Diagnoses     ICD-10-CM ICD-9-CM   1. Hyperlipidemia, unspecified hyperlipidemia type  E78.5 272.4   2. Acquired hypothyroidism  E03.9 244.9   3. Tick bite, sequela  W57. XXXS 906.2   4. Benign mole  D22.9 216.9   5. History of skin cancer  Z85.828 V10.83   6. Common wart  B07.8 078.19   7. Dizziness  R42 780.4   8. Vertigo  R42 780.4   9. Bilateral hearing loss, unspecified hearing loss type  H91.93 389.9   10. Colon cancer screening  Z12.11 V76.51       79 y/o M with HLD who does not tolerate statins and due for labs    1. Hyperlipidemia, unspecified hyperlipidemia type - pt now refusing to take Zetia, will check labs and consider Cardiology referral to see if can get patient on something for his elevated cholesterol   - LIPID PANEL; Future    2. Acquired hypothyroidism - Last TSH 1.010 10/15/19  - Continue Nature-Thyroid at 97.5mg daily  - TSH 3RD GENERATION; Future    3.  Tick bite, sequela - reports multiple tick bites this summer with small red rash, not target like rash, ticks not attached for longer than a day, denies any neurological symptoms or joint pain, requesting lyme disease testing  - LYME AB TOTAL W/RFLX W BLOT; Future    4. Benign mole with hx of skin cancer -   - REFERRAL TO DERMATOLOGY    5. Common wart - failed cryotherapy treatment, pt not trying anything over the counter, will try salicylic acid  -START salicylic acid 17 % topical gel; Soak wart in warm water for 5 minutes. Dry area thoroughly. Apply 1 drop to cover wart. Let dry. Repeat once or twice daily until wart is removed for up to 12 weeks. Dispense: 1 Tube; Refill: 1    6. Dizziness and Vertigo and suspicion of bilateral hearing loss - single episode about a week ago, went to the ER and was told everything was fine and it was vertigo, was prescribed meclizine, has not taken because has been symptom free since, requests ENT referral for work up and wants hearing loss testing   - REFERRAL TO ENT-OTOLARYNGOLOGY    7. Colon cancer screening  - REFERRAL TO GASTROENTEROLOGY        Plan of care:  Discussed diagnoses in detail with patient. Medication risks/benefits/side effects discussed with patient. All of the patient's questions were addressed. The patient understands and agrees with our plan of care. The patient knows to call back if they are unsure of or forget any changes we discussed today or if the symptoms change. The patient received an After-Visit Summary which contains VS, orders, medication list and allergy list. This can be used as a \"mini-medical record\" should they have to seek medical care while out of town. Follow-up and Dispositions    · Return in about 1 month (around 10/8/2020) for Medicare wellness visit. No future appointments.     Signed By: Chely Llanos MD     September 8, 2020

## 2020-09-08 NOTE — PATIENT INSTRUCTIONS
Epley Maneuver at Home for Vertigo: Exercises  Introduction  Vertigo is a spinning or whirling sensation when you move your head. Your doctor may have moved you in different positions to help your vertigo get better faster. This is called the Epley maneuver. Your doctor also may have asked you to do these exercises at home. Do the exercises as often as your doctor recommends. If your vertigo is getting worse, your doctor may have you change the exercise or stop it. Step 1  Step 1   1. Sit on the edge of a bed or sofa. Step 2   1. Turn your head 45 degrees in the direction your doctor told you to. This should be toward the ear that causes the most vertigo for you. In this picture, the woman is turning toward her left ear. Step 3   1. Tilt yourself backward until you are lying on your back. Your head should still be at a 45-degree turn. Your head should be about midway between looking straight ahead and looking out to your side. Hold for 30 seconds. If you have vertigo, stay in this position until it stops. Step 4   1. Turn your head 90 degrees toward the ear that has the least vertigo. In this picture, the woman is turning to the right because she has vertigo on her left side. The point of your chin should be raised and over your shoulder. Hold for 30 seconds. Step 5   1. Roll onto the side with the least vertigo. You should now be looking at the floor. Hold for 30 seconds. Follow-up care is a key part of your treatment and safety. Be sure to make and go to all appointments, and call your doctor if you are having problems. It's also a good idea to know your test results and keep a list of the medicines you take. Where can you learn more? Go to http://frances-orestes.info/  Enter P834 in the search box to learn more about \"Epley Maneuver at Home for Vertigo: Exercises. \"  Current as of: November 20, 2019               Content Version: 12.6  © 5346-6648 Healthwise, Incorporated. Care instructions adapted under license by [employer] (which disclaims liability or warranty for this information). If you have questions about a medical condition or this instruction, always ask your healthcare professional. [address] any warranty or liability for your use of this information. Tick Bite: Care Instructions  Your Care Instructions     Ticks are small spiderlike animals. They bite to fasten themselves onto your skin and feed on your blood. Ticks can carry diseases. But most ticks do not carry diseases, and most tick bites do not cause serious health problems. Some people may have an allergic reaction to a tick bite. This reaction may be mild, with symptoms like itching and swelling. In rare cases, a severe allergic reaction may occur. Most of the time, all you need to do for a tick bite is relieve any symptoms you may have. Follow-up care is a key part of your treatment and safety. Be sure to make and go to all appointments, and call your doctor if you are having problems. It's also a good idea to know your test results and keep a list of the medicines you take. How can you care for yourself at home? · Put ice or a cold pack on the bite for 15 to 20 minutes once an hour. Put a thin cloth between the ice and your skin. · Try an over-the-counter medicine to relieve itching, redness, swelling, and pain. Be safe with medicines. Read and follow all instructions on the label. ? Take an antihistamine medicine, such as a nondrowsy one like loratadine (Claritin) or one that might make you sleepy like diphenhydramine (Benadryl). These medicines may help relieve itching, redness, and swelling. ? Use a spray of local anesthetic that contains benzocaine, such as Solarcaine. It may help relieve pain. If your skin reacts to the spray, stop using it. ? Put calamine lotion on the skin. It may help relieve itching.   To avoid tick bites  · Avoid ticks:  ? Learn where ticks are found in your community, and stay away from those areas if possible. ? Cover as much of your body as possible when you work or play in grassy or wooded areas. ? Use insect repellents, such as products containing DEET. You can spray them on your skin. ? Take steps to control ticks on your property if you live in an area where Lyme disease occurs. Clear leaves, brush, tall grasses, woodpiles, and stone fences from around your house and the edges of your yard or garden. This may help get rid of ticks. · When you come in from outdoors, check your body for ticks, including your groin, head, and underarms. The ticks may be about the size of a sesame seed. If no one else can help you check for ticks on your scalp, comb your hair with a fine-tooth comb. · If you find a tick, remove it quickly. Use tweezers to grasp the tick as close to its mouth (the part in your skin) as possible. Slowly pull the tick straight out--do not twist or yank--until its mouth releases from your skin. If part of the tick stays in the skin, leave it alone. It will likely come out on its own in a few days. · Ticks can come into your house on clothing, outdoor gear, and pets. These ticks can fall off and attach to you. ? Check your clothing and outdoor gear. Remove any ticks you find. Then put your clothing in a clothes dryer on high heat for 1 hour to kill any ticks that might remain. ? Check your pets for ticks after they have been outdoors. When should you call for help? Call 911 anytime you think you may need emergency care. For example, call if:    · You have symptoms of a severe allergic reaction. These may include:  ? Sudden raised, red areas (hives) all over your body. ? Swelling of the throat, mouth, lips, or tongue. ? Trouble breathing. ? Passing out (losing consciousness). Or you may feel very lightheaded or suddenly feel weak, confused, or restless.    Call your doctor now or seek immediate medical care if:    · You have signs of infection, such as:  ? Increased pain, swelling, warmth, or redness around the bite. ? Red streaks leading from the bite. ? Pus draining from the bite. ? A fever. Watch closely for changes in your health, and be sure to contact your doctor if:    · You develop a new rash.     · You have joint pain.     · You are very tired.     · You have flu-like symptoms.     · You have symptoms for more than 1 week. Where can you learn more? Go to http://frances-orestes.info/  Enter [id number] in the search box to learn more about \"Tick Bite: Care Instructions. \"  Current as of: June 26, 2019               Content Version: 12.6  © 3345-9142 [employer]. Care instructions adapted under license by [employer] (which disclaims liability or warranty for this information). If you have questions about a medical condition or this instruction, always ask your healthcare professional. [place] [address] any warranty or liability for your use of this information.

## 2020-09-08 NOTE — PROGRESS NOTES
1. Have you been to the ER, urgent care clinic, or been hospitalized since your last visit? Vertigo, Urgent care last week    2. Have you seen or consulted any other health care providers outside of the [address] [place] [address] since your last visit? No     Reviewed record in preparation for visit and have necessary documentation  Goals that were addressed and/or need to be completed during or after this appointment include   Health Maintenance Due   Topic Date Due    DTaP/Tdap/Td series (1 - Tdap) 01/23/1973    Shingrix Vaccine Age 50> (1 of 2) 01/23/2002    FOBT Q1Y Age 54-65  01/23/2002    GLAUCOMA SCREENING Q2Y  01/23/2017    AAA Screening 73-69 YO Male Smoking Patients  01/23/2017    Pneumococcal 65+ years (1 of 1 - PPSV23) 01/23/2017    Medicare Yearly Exam  10/30/2019    Flu Vaccine (1) 09/01/2020       I have received verbal consent from Romel Jiménez to discuss any/all medical information while others present in the room.

## 2020-09-10 LAB — B BURGDOR IGM SER IA-ACNC: <0.8 INDEX (ref 0–0.79)

## 2020-09-11 NOTE — PROGRESS NOTES
Results noted. TSH normal. LDL improved, but still high. Pt does not tolerate Statins and or Zetia. Will continue to encourage lifestyle modifications and consider referral to Cardiology if patient would like to pursue other medication options.      Marielos Jon MD  PGY3 Family Medicine Resident

## 2020-10-14 ENCOUNTER — TELEPHONE (OUTPATIENT)
Dept: FAMILY MEDICINE CLINIC | Age: 70
End: 2020-10-14

## 2020-10-14 NOTE — TELEPHONE ENCOUNTER
-- Message is from the [facility]--    Caller is requesting an appointment - at a sooner time than what was available.       PCP unavailable for post hospital follow up    Reason for Visit: New Born 5days old out the hospital and need to follow up with Doctor    Is the patient currently scheduled? No    Caller Information       Type Contact Phone    2019 08:35 AM Phone (Incoming) derrick garcia (Mother) [phone]          Alternative phone number: none    Turnaround time given to caller:   \"This message will be sent to [state Provider's name]. The clinical team will fulfill your request as soon as they review your message.\"   Noted appointment 10-15-20

## 2020-10-14 NOTE — TELEPHONE ENCOUNTER
----- Message from Michelleavani Wang sent at 10/14/2020  1:58 PM EDT -----  Regarding: Deepti Benz MD  Level 1/Escalated Issue      Caller's first and last name and relationship (if not the patient):      Best contact number(s):[phone]      What are the symptoms: Right Knee pain       Transfer successful - yes/no (include outcome): N/A      Transfer declined - yes/no (include reason): N/A      Was caller advised to seek appropriate level of care - yes/no: N/A       Details to clarify the request: appt scheduled 10/15/2020        Anurag Wang

## 2020-10-15 ENCOUNTER — VIRTUAL VISIT (OUTPATIENT)
Dept: FAMILY MEDICINE CLINIC | Age: 70
End: 2020-10-15
Payer: COMMERCIAL

## 2020-10-15 DIAGNOSIS — M25.561 ACUTE PAIN OF RIGHT KNEE: Primary | ICD-10-CM

## 2020-10-15 DIAGNOSIS — M67.449 DIGITAL MUCOUS CYST: ICD-10-CM

## 2020-10-15 DIAGNOSIS — Z85.828 HX OF SKIN CANCER, BASAL CELL: ICD-10-CM

## 2020-10-15 DIAGNOSIS — E03.9 ACQUIRED HYPOTHYROIDISM: ICD-10-CM

## 2020-10-15 PROCEDURE — 99213 OFFICE O/P EST LOW 20 MIN: CPT | Performed by: STUDENT IN AN ORGANIZED HEALTH CARE EDUCATION/TRAINING PROGRAM

## 2020-10-15 RX ORDER — MECLIZINE HYDROCHLORIDE 25 MG/1
TABLET ORAL
COMMUNITY
Start: 2020-08-31 | End: 2022-05-26

## 2020-10-15 RX ORDER — THYROID,PORK 97.5 MG
TABLET ORAL
Qty: 90 TAB | Refills: 1 | Status: SHIPPED | OUTPATIENT
Start: 2020-10-15 | End: 2020-11-09 | Stop reason: SDUPTHER

## 2020-10-15 NOTE — PROGRESS NOTES
Cam Husbands  78 y.o. male  1950  [address]  [facility] [address]  [id number]   [facility]:    Telemedicine Progress Note  Cade Vaughan MD       Encounter Date and Time: October 15, 2020 at 3:34 PM    Consent:  He and/or the health care decision maker is aware that that he may receive a bill for this telephone service, depending on his insurance coverage, and has provided verbal consent to proceed: Yes    Chief Complaint   Patient presents with    Knee Pain     History of Present Illness   Cam Husbands is a 78 y.o. male was evaluated by synchronous (real-time) audio-video technology from home. R knee pain started about 4-5 days ago, pain worse with activity, using otc pain creams, \"dull ache\", \"joint feels lose\". Pain worse with lifting leg than with putting weight on it. Denies any warmth, swelling, or redness of knee. Denies any numbness or tingling or falls or injury or prior surgery to R knee. Did colonoscopy today, needs 10 yr follow up. Saw Dermatologist for skin lesions, had basal cell cancer, cyst in finger, got referral to ortho for cyst.     Denies any fever, chills, cough, SOB, sick contacts, or recent travel. Review of Systems   Review of Systems   Constitutional: Negative for chills and fever. Respiratory: Negative for cough and shortness of breath. Gastrointestinal: Negative for abdominal pain, nausea and vomiting. Musculoskeletal: Positive for joint pain (R knee). Neurological: Negative for dizziness and headaches.        Vitals/Objective:     General: alert, cooperative, no distress   Mental  status: mental status: alert, oriented to person, place, and time, normal mood, behavior, speech, dress, motor activity, and thought processes   Resp: resp: normal effort, no respiratory distress and not coughing   Neuro:  Musc: neuro: no gross deficits   R Knee did not appear to be swollen   Skin: skin: no discoloration or lesions of concern on visible areas   Due to this being a TeleHealth evaluation, many elements of the physical examination are unable to be assessed. Assessment and Plan:   Time-based coding, delete if not needed: I spent at least 15 minutes with this established patient, and >50% of the time was spent counseling and/or coordinating care regarding knee pain    Sheryl Riggs is a 78 y.o. male with acute R knee pain without injury or trauma    1. Acute pain of right knee - no injury, trauma, swelling, erythema, or warmth, likely arthritis vs tendonitis vs meniscal injury, no back pain, trouble walking, numbness, or tingling   -Aleve/Tylenol PRN  -Icing and stretching  -If does not improve or worsens, will bring in to clinic for exam and xray and consider PT referral     2. Acquired hypothyroidism - stable  Lab Results   Component Value Date/Time    TSH 2.24 09/08/2020 09:58 AM     -REFILLED thyroid, pork, (Nature-Throid) 97.5 mg tab; TAKE ONE TABLET BY MOUTH EVERY DAY  Dispense: 90 Tab; Refill: 1    3. Digital mucous cyst - diagnosed by dermatologist recently, records scanned into media tab  -Pending referral to orthopedics for resection    4. Hx of skin cancer, basal cell - recently dermatology clinic note reviewed and scanned into media tab  -F/u with Dermatology as needed who is managing patient's skin problems         Time spent in direct conversation with the patient to include medical condition(s) discussed, assessment and treatment plan:       We discussed the expected course, resolution and complications of the diagnosis(es) in detail. Medication risks, benefits, costs, interactions, and alternatives were discussed as indicated. I advised him to contact the office if his condition worsens, changes or fails to improve as anticipated. He expressed understanding with the diagnosis(es) and plan. Patient understands that this encounter was a temporary measure, and the importance of further follow up and examination was emphasized.   Patient verbalized understanding. Patient informed to follow up: Follow-up and Dispositions  ·   Return in about 5 months (around 3/15/2021), or if symptoms worsen or fail to improve, for TSH recheck . Electronically Signed: Nadeem Klein MD    Providers location when delivering service (clinic, hospital, home): Home      ICD-10-CM ICD-9-CM    1. Acute pain of right knee  M25.561 719.46    2. Acquired hypothyroidism  E03.9 244.9 thyroid, pork, (Nature-Throid) 97.5 mg tab   3. Digital mucous cyst  M67.449 727.43    4. Hx of skin cancer, basal cell  Z85.828 V10.83            Pursuant to the emergency declaration under the [address] [place] [address], [facility] waiver authority and the [employer] Act, this Virtual  Visit was conducted, with patient's consent, to reduce the patient's risk of exposure to COVID-19 and provide continuity of care for an established patient. Services were provided through a video synchronous discussion virtually to substitute for in-person clinic visit. History   Patients past medical, surgical and family histories were reviewed.     Past Medical History:   Diagnosis Date    Depression     Hypercholesterolemia     Skin cancer     Thyroid disease      Past Surgical History:   Procedure Laterality Date    HX HEENT  1998    subcutaneous cyst removed from head    HX HEENT  1999    skin cancer removed from face    HX TONSIL AND ADENOIDECTOMY       Family History   Problem Relation Age of Onset    Alcohol abuse Mother     Dementia Mother     Suicide Father     Other Brother         rnp1 protein disorder with elevated inflamatory markers     Social History     Socioeconomic History    Marital status:      Spouse name: Not on file    Number of children: Not on file    Years of education: Not on file    Highest education level: Not on file   Occupational History    Not on file   Social Needs  Financial resource strain: Not on file   Adam-Adriane insecurity     Worry: Not on file     Inability: Not on file   NJOY needs     Medical: Not on file     Non-medical: Not on file   Tobacco Use    Smoking status: Former Smoker     Packs/day: 1.00     Years: 6.00     Pack years: 6.00     Types: Cigarettes    Smokeless tobacco: Never Used   Substance and Sexual Activity    Alcohol use: Not Currently    Drug use: Not on file    Sexual activity: Yes     Partners: Female   Lifestyle    Physical activity     Days per week: Not on file     Minutes per session: Not on file    Stress: Not on file   Relationships    Social connections     Talks on phone: Not on file     Gets together: Not on file     Attends Tenriism service: Not on file     Active member of club or organization: Not on file     Attends meetings of clubs or organizations: Not on file     Relationship status: Not on file    Intimate partner violence     Fear of current or ex partner: Not on file     Emotionally abused: Not on file     Physically abused: Not on file     Forced sexual activity: Not on file   Other Topics Concern    Not on file   Social History Narrative    Not on file     Patient Active Problem List   Diagnosis Code    Hyperlipidemia E78.5    Acquired hypothyroidism E03.9    Chronic right shoulder pain M25.511, G89.29    Astigmatism H52.209    Former smoker Z87.891    Digital mucous cyst M67.449          Current Medications/Allergies   Medications and Allergies reviewed:    Current Outpatient Medications   Medication Sig Dispense Refill    thyroid, pork, (Nature-Throid) 97.5 mg tab TAKE ONE TABLET BY MOUTH EVERY DAY 90 Tab 1    meclizine (ANTIVERT) 25 mg tablet       salicylic acid 17 % topical gel Soak wart in warm water for 5 minutes. Dry area thoroughly. Apply 1 drop to cover wart. Let dry. Repeat once or twice daily until wart is removed for up to 12 weeks.  1 Tube 1    OTHER Hammer hemp 0.0%THC      diclofenac (VOLTAREN) 1 % gel Apply 2 g to affected area four (4) times daily.  1 Each 1     Allergies   Allergen Reactions    Atorvastatin Myalgia    Crestor [Rosuvastatin] Myalgia    Zetia [Ezetimibe] Myalgia     \"muscle cramps\"

## 2020-10-15 NOTE — PROGRESS NOTES
[address] Medicine Residency Attending Addendum:  Dr. Kevin Rojas MD,  the patient and I were not physically present during this encounter. The resident and I are concurrently monitoring the patient care through appropriate telecommunication technology. I discussed the findings, assessment and plan with the resident and agree with the resident's findings and plan as documented in the resident's note.       Sonja Marcial MD

## 2020-11-06 ENCOUNTER — TELEPHONE (OUTPATIENT)
Dept: FAMILY MEDICINE CLINIC | Age: 70
End: 2020-11-06

## 2020-11-06 DIAGNOSIS — E03.9 ACQUIRED HYPOTHYROIDISM: ICD-10-CM

## 2020-11-06 RX ORDER — THYROID,PORK 97.5 MG
TABLET ORAL
Qty: 90 TAB | Refills: 1 | Status: CANCELLED | OUTPATIENT
Start: 2020-11-06

## 2020-11-06 NOTE — TELEPHONE ENCOUNTER
----- Message from BeauCoo sent at 11/6/2020  8:41 AM EST -----  Regarding: /Telephone  Contact: [phone]  General Message/Vendor Calls    Caller's first and last name:pt      Reason for call:find mail order pharmacy or med substitute      Callback required yes/no and why:yes to confirm that rx can be sent to mail order pharmacy       Best contact number(s):[phone]      Details to clarify the request:Pt says that he is having trouble filling his \"(Nature-Throid) 97.5 mg\" via [facility] and would like to have rx re routed to a mail order pharmacy.  He also wants to follow up with orthopedic MD regarding R knee, please advise      [employer]

## 2020-11-06 NOTE — TELEPHONE ENCOUNTER
Attempted to call. Unsuccessful.  Message left  Pt will need to provide information on name of mail order pharamcy

## 2020-11-09 ENCOUNTER — PATIENT MESSAGE (OUTPATIENT)
Dept: FAMILY MEDICINE CLINIC | Age: 70
End: 2020-11-09

## 2020-11-09 DIAGNOSIS — E03.9 ACQUIRED HYPOTHYROIDISM: ICD-10-CM

## 2020-11-09 RX ORDER — THYROID,PORK 97.5 MG
TABLET ORAL
Qty: 90 TAB | Refills: 1 | Status: SHIPPED | OUTPATIENT
Start: 2020-11-09 | End: 2020-11-17 | Stop reason: ALTCHOICE

## 2020-11-10 ENCOUNTER — OFFICE VISIT (OUTPATIENT)
Dept: FAMILY MEDICINE CLINIC | Age: 70
End: 2020-11-10
Payer: COMMERCIAL

## 2020-11-10 VITALS
BODY MASS INDEX: 27.58 KG/M2 | OXYGEN SATURATION: 97 % | WEIGHT: 197 LBS | TEMPERATURE: 98.4 F | HEART RATE: 76 BPM | HEIGHT: 71 IN | RESPIRATION RATE: 16 BRPM | SYSTOLIC BLOOD PRESSURE: 119 MMHG | DIASTOLIC BLOOD PRESSURE: 65 MMHG

## 2020-11-10 DIAGNOSIS — M25.561 ACUTE PAIN OF RIGHT KNEE: Primary | ICD-10-CM

## 2020-11-10 DIAGNOSIS — M79.671 ACUTE PAIN OF RIGHT FOOT: ICD-10-CM

## 2020-11-10 PROCEDURE — G8536 NO DOC ELDER MAL SCRN: HCPCS | Performed by: STUDENT IN AN ORGANIZED HEALTH CARE EDUCATION/TRAINING PROGRAM

## 2020-11-10 PROCEDURE — 3017F COLORECTAL CA SCREEN DOC REV: CPT | Performed by: STUDENT IN AN ORGANIZED HEALTH CARE EDUCATION/TRAINING PROGRAM

## 2020-11-10 PROCEDURE — G8419 CALC BMI OUT NRM PARAM NOF/U: HCPCS | Performed by: STUDENT IN AN ORGANIZED HEALTH CARE EDUCATION/TRAINING PROGRAM

## 2020-11-10 PROCEDURE — G8510 SCR DEP NEG, NO PLAN REQD: HCPCS | Performed by: STUDENT IN AN ORGANIZED HEALTH CARE EDUCATION/TRAINING PROGRAM

## 2020-11-10 PROCEDURE — G8427 DOCREV CUR MEDS BY ELIG CLIN: HCPCS | Performed by: STUDENT IN AN ORGANIZED HEALTH CARE EDUCATION/TRAINING PROGRAM

## 2020-11-10 PROCEDURE — 1101F PT FALLS ASSESS-DOCD LE1/YR: CPT | Performed by: STUDENT IN AN ORGANIZED HEALTH CARE EDUCATION/TRAINING PROGRAM

## 2020-11-10 PROCEDURE — 99214 OFFICE O/P EST MOD 30 MIN: CPT | Performed by: STUDENT IN AN ORGANIZED HEALTH CARE EDUCATION/TRAINING PROGRAM

## 2020-11-10 RX ORDER — DICLOFENAC SODIUM 50 MG/1
50 TABLET, DELAYED RELEASE ORAL 2 TIMES DAILY
Qty: 14 TAB | Refills: 0 | Status: SHIPPED | OUTPATIENT
Start: 2020-11-10 | End: 2020-12-08 | Stop reason: SDUPTHER

## 2020-11-10 NOTE — PROGRESS NOTES
1. Have you been to the ER, urgent care clinic since your last visit? Hospitalized since your last visit? No    2. Have you seen or consulted any other health care providers outside of the [address] [place] [address] since your last visit? Include any pap smears or colon screening.  No  Reviewed record in preparation for visit and have necessary documentation  Pt did not bring medication to office visit for review    Goals that were addressed and/or need to be completed during or after this appointment include   Health Maintenance Due   Topic Date Due    DTaP/Tdap/Td series (1 - Tdap) 01/23/1973    Shingrix Vaccine Age 50> (1 of 2) 01/23/2002    Colorectal Cancer Screening Combo  01/23/2002    GLAUCOMA SCREENING Q2Y  01/23/2017    AAA Screening 73-69 YO Male Smoking Patients  01/23/2017    Pneumococcal 65+ years (1 of 1 - PPSV23) 01/23/2017    Medicare Yearly Exam  10/30/2019    Flu Vaccine (1) 09/01/2020     [facility] gastro- daksha saul- colonscopy

## 2020-11-10 NOTE — PATIENT INSTRUCTIONS
How to do the exercises Straight-leg raises to the front 1. Lie on your back with your good knee bent so that your foot rests flat on the floor. Your affected leg should be straight. Make sure that your low back has a normal curve. You should be able to slip your hand in between the floor and the small of your back, with your palm touching the floor and your back touching the back of your hand. 2. Tighten the thigh muscles in your affected leg by pressing the back of your knee flat down to the floor. Hold your knee straight. 3. Keeping the thigh muscles tight and your leg straight, lift your leg up so that your heel is about 12 inches off the floor. 4. Hold for about 6 seconds, then lower your leg slowly. Rest for up to 10 seconds between repetitions. 5. Repeat 8 to 12 times. Short-arc quad 1. Lie on your back with your knees bent over a foam roll or large rolled-up towel and your heels on the floor. 2. Lift the lower part of your affected leg until your leg is straight. Keep the back of your knee on the foam roll or towel. 3. Hold your leg straight for about 6 seconds, then slowly bend your knee and lower your heel back to the floor. Rest for up to 10 seconds between repetitions. 4. Repeat 8 to 12 times. Half-squat with knees and feet turned out to the side 1. Stand with your feet wider than shoulder-width apart and turned out to the side about 45 degrees. 2. Keep your back straight, and tighten your buttocks. 3. Slowly bend your knees to lower your body about one-quarter of the way down toward the floor. Try to keep your back straight at all times, and do not let your pelvis tilt forward or your knees extend beyond the tip of your toes. 4. Repeat 8 to 12 times. Step up 1. Place a single-step footstool on the floor. If you do not have a footstool, you can use a thick book, such as a phone book, a dictionary, or an encyclopedia.  If you are not steady on your feet, hold on to a chair, counter, or wall while you do this exercise. 2. Keeping your back straight, step up with your affected leg. Try not to push off your back leg as you step up. Only use your affected leg to bring you up on to the step. Then lift your other leg on to the step. As you step up, try to keep your knee moving in a straight line with your middle toe. 3. Move back to the starting position, with both feet on the floor. 4. Repeat 8 to 12 times. Step down 1. Stand on a single-step footstool. If you do not have a footstool, you can use a thick book, such as a phone book, a dictionary, or an encyclopedia. If you are not steady on your feet, hold on to a chair, counter, or wall while you do this exercise. 2. Slowly step down with your good leg, allowing your heel to lightly touch the floor. As you step down, try to keep your affected knee moving in a straight line toward your middle toe. 3. Move back to the starting position, with both feet on the footstool or book. 4. Repeat 8 to 12 times. Terminal knee extension 1. Tie the ends of an exercise band together to form a loop. Attach one end of the loop to a secure object, or shut a door on it to hold it in place. (Or you can have someone hold one end of the loop to provide resistance.) 2. Loop the other end of the exercise band around the knee of your affected leg. Keep that leg somewhat bent at the knee. 3. Put your good leg about a step behind your affected leg. Then slowly straighten your affected leg by tightening the thigh muscles of that leg. 4. Hold for about 6 seconds, then return to the starting position, with your knee somewhat bent. 5. Rest for up to 10 seconds. 6. Repeat 8 to 12 times. Follow-up care is a key part of your treatment and safety. Be sure to make and go to all appointments, and call your doctor if you are having problems. It's also a good idea to know your test results and keep a list of the medicines you take. Where can you learn more? Go to http://www.gray.com/ Enter G522 in the search box to learn more about \"Patellar Tendinitis (Jumper's Knee): Exercises. \" Current as of: March 2, 2020               Content Version: 12.6 © 3262-9747 [employer]. Care instructions adapted under license by [employer] (which disclaims liability or warranty for this information). If you have questions about a medical condition or this instruction, always ask your healthcare professional. [address] any warranty or liability for your use of this information.
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
## 2020-11-16 ENCOUNTER — PATIENT MESSAGE (OUTPATIENT)
Dept: FAMILY MEDICINE CLINIC | Age: 70
End: 2020-11-16

## 2020-11-16 ENCOUNTER — TELEPHONE (OUTPATIENT)
Dept: FAMILY MEDICINE CLINIC | Age: 70
End: 2020-11-16

## 2020-11-16 DIAGNOSIS — E03.9 ACQUIRED HYPOTHYROIDISM: Primary | ICD-10-CM

## 2020-11-16 NOTE — TELEPHONE ENCOUNTER
Received message from pt's prescription company   Prescription Nature-Thyroid 97.5 mg tablets  Has been recalled and is on longer on the market please provide an alternate therapy

## 2020-11-17 RX ORDER — LEVOTHYROXINE AND LIOTHYRONINE 57; 13.5 UG/1; UG/1
90 TABLET ORAL DAILY
Qty: 90 TAB | Refills: 0 | Status: SHIPPED | OUTPATIENT
Start: 2020-11-17 | End: 2021-03-17

## 2020-12-08 DIAGNOSIS — M79.671 ACUTE PAIN OF RIGHT FOOT: ICD-10-CM

## 2020-12-08 DIAGNOSIS — Z76.89 ENCOUNTER TO ESTABLISH CARE: ICD-10-CM

## 2020-12-08 DIAGNOSIS — G89.29 CHRONIC RIGHT SHOULDER PAIN: ICD-10-CM

## 2020-12-08 DIAGNOSIS — M25.511 CHRONIC RIGHT SHOULDER PAIN: ICD-10-CM

## 2020-12-08 DIAGNOSIS — M25.561 ACUTE PAIN OF RIGHT KNEE: ICD-10-CM

## 2020-12-08 RX ORDER — DICLOFENAC SODIUM 10 MG/G
2 GEL TOPICAL 4 TIMES DAILY
Qty: 1 EACH | Refills: 1 | Status: SHIPPED | OUTPATIENT
Start: 2020-12-08 | End: 2020-12-29 | Stop reason: SDUPTHER

## 2020-12-08 RX ORDER — DICLOFENAC SODIUM 50 MG/1
50 TABLET, DELAYED RELEASE ORAL 2 TIMES DAILY
Qty: 14 TAB | Refills: 0 | Status: SHIPPED | OUTPATIENT
Start: 2020-12-08 | End: 2020-12-29 | Stop reason: SDUPTHER

## 2020-12-29 DIAGNOSIS — M25.511 CHRONIC RIGHT SHOULDER PAIN: ICD-10-CM

## 2020-12-29 DIAGNOSIS — G89.29 CHRONIC RIGHT SHOULDER PAIN: ICD-10-CM

## 2020-12-29 DIAGNOSIS — M79.671 ACUTE PAIN OF RIGHT FOOT: ICD-10-CM

## 2020-12-29 DIAGNOSIS — Z76.89 ENCOUNTER TO ESTABLISH CARE: ICD-10-CM

## 2020-12-29 DIAGNOSIS — M25.561 ACUTE PAIN OF RIGHT KNEE: ICD-10-CM

## 2020-12-29 RX ORDER — DICLOFENAC SODIUM 10 MG/G
2 GEL TOPICAL 4 TIMES DAILY
Qty: 1 EACH | Refills: 1 | Status: CANCELLED | OUTPATIENT
Start: 2020-12-29

## 2020-12-29 RX ORDER — DICLOFENAC SODIUM 50 MG/1
50 TABLET, DELAYED RELEASE ORAL 2 TIMES DAILY
Qty: 14 TAB | Refills: 0 | Status: SHIPPED | OUTPATIENT
Start: 2020-12-29 | End: 2020-12-29 | Stop reason: SDUPTHER

## 2020-12-29 RX ORDER — DICLOFENAC SODIUM 50 MG/1
50 TABLET, DELAYED RELEASE ORAL 2 TIMES DAILY
Qty: 14 TAB | Refills: 0 | Status: SHIPPED | OUTPATIENT
Start: 2020-12-29 | End: 2021-01-27

## 2020-12-29 NOTE — TELEPHONE ENCOUNTER
----- Message from Lolis Clinton sent at 12/29/2020  1:43 PM EST -----  Regarding: Dr Alverna Riedel (if not patient): N/A  Relationship of caller (if not patient): N/A  Best contact number(s): [phone]  Name of medication and dosage if known: \"Diclofenac\"   Is patient out of this medication (yes/no): yes  Pharmacy name: [facility] listed in chart? (yes/no): yes  Pharmacy phone number: [phone]  Date of last visit: 11/10/2020  Details to clarify the request: Pt is requesting tablets for Rx \"Diclofenac\" be sent into his pharmacy today because patient has been waiting for this medication since 12/8 and received the incorrect medication. Pt is upset because he does not want the gel for Rx \"Diclofenac\" he wants the tablets for Rx \"Diclofenac\" as previously prescribed by Dr Richard Brock.  Pt would like to be notified via [address]

## 2021-01-27 DIAGNOSIS — M79.671 ACUTE PAIN OF RIGHT FOOT: ICD-10-CM

## 2021-01-27 DIAGNOSIS — M25.561 ACUTE PAIN OF RIGHT KNEE: ICD-10-CM

## 2021-01-27 DIAGNOSIS — G89.29 CHRONIC RIGHT SHOULDER PAIN: ICD-10-CM

## 2021-01-27 DIAGNOSIS — M25.511 CHRONIC RIGHT SHOULDER PAIN: ICD-10-CM

## 2021-01-27 DIAGNOSIS — Z76.89 ENCOUNTER TO ESTABLISH CARE: ICD-10-CM

## 2021-01-27 RX ORDER — DICLOFENAC SODIUM 10 MG/G
GEL TOPICAL
Qty: 100 G | Refills: 1 | Status: SHIPPED | OUTPATIENT
Start: 2021-01-27 | End: 2021-01-27 | Stop reason: SDUPTHER

## 2021-01-27 RX ORDER — DICLOFENAC SODIUM 50 MG/1
TABLET, DELAYED RELEASE ORAL
Qty: 14 TAB | Refills: 0 | Status: SHIPPED | OUTPATIENT
Start: 2021-01-27 | End: 2021-01-28 | Stop reason: SDUPTHER

## 2021-01-28 RX ORDER — DICLOFENAC SODIUM 50 MG/1
50 TABLET, DELAYED RELEASE ORAL 2 TIMES DAILY
Qty: 30 TAB | Refills: 0 | Status: SHIPPED | OUTPATIENT
Start: 2021-01-28 | End: 2021-01-28 | Stop reason: SDUPTHER

## 2021-01-28 RX ORDER — DICLOFENAC SODIUM 50 MG/1
TABLET, DELAYED RELEASE ORAL
Qty: 14 TAB | Refills: 0 | Status: CANCELLED | OUTPATIENT
Start: 2021-01-28

## 2021-01-28 NOTE — TELEPHONE ENCOUNTER
----- Message from Brigido Love sent at 1/27/2021  4:50 PM EST -----  Regarding: Prescription Question  Contact: [phone]  Dr Marycruz Suárez    Refill Request.    Tablets 50MG Diclofenac Sodium  2X daily  --  Can I have a 30 day refill?  [facility] [place] [address]

## 2021-02-09 ENCOUNTER — VIRTUAL VISIT (OUTPATIENT)
Dept: FAMILY MEDICINE CLINIC | Age: 71
End: 2021-02-09
Payer: COMMERCIAL

## 2021-02-09 DIAGNOSIS — T14.8XXA MUSCLE STRAIN: ICD-10-CM

## 2021-02-09 DIAGNOSIS — M25.562 CHRONIC PAIN OF BOTH KNEES: ICD-10-CM

## 2021-02-09 DIAGNOSIS — K59.00 CONSTIPATION, UNSPECIFIED CONSTIPATION TYPE: ICD-10-CM

## 2021-02-09 DIAGNOSIS — G89.29 CHRONIC PAIN OF BOTH KNEES: ICD-10-CM

## 2021-02-09 DIAGNOSIS — M62.838 MUSCLE SPASM: Primary | ICD-10-CM

## 2021-02-09 DIAGNOSIS — M25.511 CHRONIC RIGHT SHOULDER PAIN: ICD-10-CM

## 2021-02-09 DIAGNOSIS — G89.29 CHRONIC RIGHT SHOULDER PAIN: ICD-10-CM

## 2021-02-09 DIAGNOSIS — M25.561 CHRONIC PAIN OF BOTH KNEES: ICD-10-CM

## 2021-02-09 PROCEDURE — 99443 PR PHYS/QHP TELEPHONE EVALUATION 21-30 MIN: CPT | Performed by: STUDENT IN AN ORGANIZED HEALTH CARE EDUCATION/TRAINING PROGRAM

## 2021-02-09 RX ORDER — DICLOFENAC SODIUM 50 MG/1
50 TABLET, DELAYED RELEASE ORAL
Qty: 60 TAB | Refills: 2 | Status: SHIPPED | OUTPATIENT
Start: 2021-02-09 | End: 2021-06-04 | Stop reason: SDUPTHER

## 2021-02-09 RX ORDER — CYCLOBENZAPRINE HCL 5 MG
5 TABLET ORAL
Qty: 30 TAB | Refills: 0 | Status: SHIPPED | OUTPATIENT
Start: 2021-02-09 | End: 2022-02-22

## 2021-02-09 NOTE — PROGRESS NOTES
[address] Medicine Residency Attending Addendum:  Dr. Chayo Spear MD,  the patient and I were not physically present during this encounter. The resident and I are concurrently monitoring the patient care through appropriate telecommunication technology. I discussed the findings, assessment and plan with the resident and agree with the resident's findings and plan as documented in the resident's note.       Mansoor Rene MD

## 2021-02-09 NOTE — PROGRESS NOTES
Ruslan Moore  73 y.o. male  1950  [address] [address]  [id number]    [phone] (home)      [facility]:    Telephone Encounter  Matteo Anne MD       Encounter Date: 2/9/2021 at 2:12 PM    Consent:  He and/or the health care decision maker is aware that that he may receive a bill for this telephone service, depending on his insurance coverage, and has provided verbal consent to proceed: Yes    Chief Complaint   Patient presents with    Back Pain       History of Present Illness   uRslan Moore is a 73 y.o. male was evaluated by audio technology from home, through the phone only. I communicated with the patient and/or health care decision maker about Joint and back pain and constipation. Takes diclofenac with breakfast and dinner twice a day, knees and shoulders are \"teriffic\". 2 weeks ago, started with some low back pain, painful getting out of bed in the morning, eventually area \"warms up and goes away\". Middle low back, no falls or injury. Using diclofenac gel. Constipation: Takes laxative a few times a week which resolves issue. Been going on for 1/2 year. Was taking [employer] fiber supplement before. Had colonoscopy October 2020, come back in 10 years. No blood in stool. Review of Systems   Review of Systems   Constitutional: Negative for fever. Gastrointestinal: Positive for constipation. Negative for nausea and vomiting. Musculoskeletal: Positive for back pain and joint pain. Negative for falls. Neurological: Negative for dizziness, tingling, sensory change, focal weakness, loss of consciousness and headaches. Vitals/Objective:   General: Patient speaking in complete sentences without effort. Normal speech and cooperative. Not coughing. Due to this being a Virtual Check-in/Telephone evaluation, many elements of the physical examination are unable to be assessed. Assessment and Plan:   Time-based coding, delete if not needed:  I spent at least 15 minutes with this established patient, and >50% of the time was spent counseling and/or coordinating care regarding muscle spams and constipation  Total Time: minutes: 21-30 minutes    Yara Rader is a 73 y.o. male with muscle spasm and constipation      1. Muscle spasm, muscle strain  -START cyclobenzaprine (FLEXERIL) 5 mg tablet; Take 1 Tab by mouth three (3) times daily as needed for Muscle Spasm(s). Dispense: 30 Tab; Refill: 0  -Continue diclofenac PRN  -Encouraging stretching and icing, no heavy lifting    2. Constipation, unspecified constipation type - no blood in stool, reports normal colonoscopy last year  -START miralax daily, increase or decrease as needed  -Encouraging fiber supplement  -Continue dulcolax PRN   -Drink plenty of water    3. Chronic right shoulder pain and knee pain - well controlled  -REFILLED diclofenac EC (VOLTAREN) 50 mg EC tablet; Take 1 Tab by mouth two (2) times daily as needed for Pain. Dispense: 60 Tab; Refill: 2        We discussed the expected course, resolution and complications of the diagnosis(es) in detail. Medication risks, benefits, costs, interactions, and alternatives were discussed as indicated. I advised him to contact the office if his condition worsens, changes or fails to improve as anticipated. He expressed understanding with the diagnosis(es) and plan. Patient understands that this encounter was a temporary measure, and the importance of further follow up and examination was emphasized. Patient verbalized understanding. Patient informed to follow up: Follow-up and Dispositions  ·   Return if symptoms worsen or fail to improve. I affirm this is a Patient Initiated Episode with an Established Patient who has not had a related appointment within my department in the past 7 days or scheduled within the next 24 hours.   Note: not billable if this call serves to triage the patient into an appointment for the relevant concern      Electronically Signed: Zabrina Squires MD  Providers location when delivering service: Home        ICD-10-CM ICD-9-CM    1. Muscle spasm  M62.838 728.85 cyclobenzaprine (FLEXERIL) 5 mg tablet   2. Muscle strain  T14. 8XXA 848.9 cyclobenzaprine (FLEXERIL) 5 mg tablet      diclofenac EC (VOLTAREN) 50 mg EC tablet   3. Constipation, unspecified constipation type  K59.00 564.00    4. Chronic right shoulder pain  M25.511 719.41 diclofenac EC (VOLTAREN) 50 mg EC tablet    G89.29 338.29    5. Chronic pain of both knees  M25.561 719.46 diclofenac EC (VOLTAREN) 50 mg EC tablet    M25.562 338.29     G89.29         Pursuant to the emergency declaration under the [address] [place] [address] waiver authority and the [employer] Act, this Virtual  Visit was conducted, with patient's consent, to reduce the patient's risk of exposure to COVID-19 and provide continuity of care for an established patient. History   Patients past medical, surgical and family histories were personally reviewed.     Past Medical History:   Diagnosis Date    Depression     Hypercholesterolemia     Skin cancer     Thyroid disease      Past Surgical History:   Procedure Laterality Date    HX HEENT  1998    subcutaneous cyst removed from head    HX HEENT  1999    skin cancer removed from face    HX TONSIL AND ADENOIDECTOMY       Family History   Problem Relation Age of Onset    Alcohol abuse Mother     Dementia Mother     Suicide Father     Other Brother         [id number] protein disorder with elevated inflamatory markers     Social History     Socioeconomic History    Marital status:      Spouse name: Not on file    Number of children: Not on file    Years of education: Not on file    Highest education level: Not on file   Occupational History    Not on file   Social Needs    Financial resource strain: Not on file    Food insecurity Worry: Not on file     Inability: Not on file    Transportation needs     Medical: Not on file     Non-medical: Not on file   Tobacco Use    Smoking status: Former Smoker     Packs/day: 1.00     Years: 6.00     Pack years: 6.00     Types: Cigarettes    Smokeless tobacco: Never Used   Substance and Sexual Activity    Alcohol use: Not Currently    Drug use: Not on file    Sexual activity: Yes     Partners: Female   Lifestyle    Physical activity     Days per week: Not on file     Minutes per session: Not on file    Stress: Not on file   Relationships    Social connections     Talks on phone: Not on file     Gets together: Not on file     Attends Baptism service: Not on file     Active member of club or organization: Not on file     Attends meetings of clubs or organizations: Not on file     Relationship status: Not on file    Intimate partner violence     Fear of current or ex partner: Not on file     Emotionally abused: Not on file     Physically abused: Not on file     Forced sexual activity: Not on file   Other Topics Concern    Not on file   Social History Narrative    Not on file            Current Medications/Allergies   Medications and Allergies reviewed:    Current Outpatient Medications   Medication Sig Dispense Refill    cyclobenzaprine (FLEXERIL) 5 mg tablet Take 1 Tab by mouth three (3) times daily as needed for Muscle Spasm(s). 30 Tab 0    diclofenac EC (VOLTAREN) 50 mg EC tablet Take 1 Tab by mouth two (2) times daily as needed for Pain. 60 Tab 2    thyroid, Pork, ([place] Thyroid) 90 mg tablet Take 1 Tab by mouth daily. 90 Tab 0    meclizine (ANTIVERT) 25 mg tablet       salicylic acid 17 % topical gel Soak wart in warm water for 5 minutes. Dry area thoroughly. Apply 1 drop to cover wart. Let dry. Repeat once or twice daily until wart is removed for up to 12 weeks.  1 Tube 1    OTHER Hammer hemp 0.0%THC       Allergies   Allergen Reactions    Atorvastatin Myalgia    Crestor [Rosuvastatin] Myalgia    Zetia [Ezetimibe] Myalgia     \"muscle cramps\"

## 2021-05-21 PROBLEM — Z85.828 HX OF BASAL CELL CARCINOMA: Status: ACTIVE | Noted: 2021-05-21

## 2021-06-04 ENCOUNTER — VIRTUAL VISIT (OUTPATIENT)
Dept: FAMILY MEDICINE CLINIC | Age: 71
End: 2021-06-04

## 2021-06-04 DIAGNOSIS — M25.511 CHRONIC RIGHT SHOULDER PAIN: ICD-10-CM

## 2021-06-04 DIAGNOSIS — M25.561 CHRONIC PAIN OF BOTH KNEES: ICD-10-CM

## 2021-06-04 DIAGNOSIS — E03.9 ACQUIRED HYPOTHYROIDISM: ICD-10-CM

## 2021-06-04 DIAGNOSIS — R53.83 FATIGUE, UNSPECIFIED TYPE: ICD-10-CM

## 2021-06-04 DIAGNOSIS — E34.9 TESTOSTERONE DEFICIENCY: ICD-10-CM

## 2021-06-04 DIAGNOSIS — E78.5 HYPERLIPIDEMIA, UNSPECIFIED HYPERLIPIDEMIA TYPE: ICD-10-CM

## 2021-06-04 DIAGNOSIS — G89.29 CHRONIC PAIN OF BOTH KNEES: ICD-10-CM

## 2021-06-04 DIAGNOSIS — T14.8XXA MUSCLE STRAIN: ICD-10-CM

## 2021-06-04 DIAGNOSIS — N52.9 ERECTILE DYSFUNCTION, UNSPECIFIED ERECTILE DYSFUNCTION TYPE: Primary | ICD-10-CM

## 2021-06-04 DIAGNOSIS — M25.562 CHRONIC PAIN OF BOTH KNEES: ICD-10-CM

## 2021-06-04 DIAGNOSIS — G89.29 CHRONIC RIGHT SHOULDER PAIN: ICD-10-CM

## 2021-06-04 PROCEDURE — 99214 OFFICE O/P EST MOD 30 MIN: CPT | Performed by: STUDENT IN AN ORGANIZED HEALTH CARE EDUCATION/TRAINING PROGRAM

## 2021-06-04 RX ORDER — SILDENAFIL 50 MG/1
50 TABLET, FILM COATED ORAL AS NEEDED
Qty: 30 TABLET | Refills: 0 | Status: SHIPPED | OUTPATIENT
Start: 2021-06-04 | End: 2022-02-22

## 2021-06-04 RX ORDER — DICLOFENAC SODIUM 50 MG/1
50 TABLET, DELAYED RELEASE ORAL
Qty: 60 TABLET | Refills: 2 | Status: SHIPPED | OUTPATIENT
Start: 2021-06-04 | End: 2021-11-23 | Stop reason: SDUPTHER

## 2021-06-04 NOTE — PROGRESS NOTES
Denice Samuel  73 y.o. male  1950  [address]  [place] [facility] [address]  [id number]   [facility]:    Telemedicine Progress Note  Loree Brown MD       Encounter Date and Time: June 4, 2021 at 2:48 PM    Consent:  He and/or the health care decision maker is aware that that he may receive a bill for this telephone/video service, depending on his insurance coverage, and has provided verbal consent to proceed: Yes    Chief Complaint   Patient presents with    Erectile Dysfunction     History of Present Illness   Denice Samuel is a 73 y.o. male was evaluated by synchronous (real-time) audio-video technology from home. Pt complains of trouble with erections and would like to try Viagra. Pt reports trouble gaining and maintaining erections, sometimes erections don't last. Has been slowly worsening for years and would like to try something for it now. Reports good libido, has a desire for sexual activity. Has infrequent morning erection, semi-full, \"not like when I was 20\". Denies any hx of heart disease or CP/SOB with exercise or activity, and not on any nitrates. Testosterone: Reports he was on a testosterone creams years in the past for low testosterone and would like to consider restarting this. Says when using the cream was less fatigued. Will need labs first.     Chronic pain: Multiple joints, knees, shoulder, and sometimes back pain. Takes diclofenac 50mg BID PRN, usually once a day at the most which works well, would like refill. Has tried tylenol which is not helpful. Has tried voltaren gel which doesn't work as well and difficult to use on multiple locations at once. Had colonoscopy October 2020, reported to be normal and next due in 10 yrs, need to get records for this. Review of Systems   Review of Systems   Constitutional: Positive for malaise/fatigue. Respiratory: Negative for cough and shortness of breath. Cardiovascular: Negative for chest pain. Gastrointestinal: Negative for nausea and vomiting. Genitourinary:        Trouble gaining and maintaining erections   Musculoskeletal: Positive for joint pain. Neurological: Negative for dizziness and headaches. Vitals/Objective:     General: alert, cooperative, no distress   Mental  status: mental status: alert, normal mood, behavior, speech, dress, motor activity, and thought processes   Resp: resp: normal effort, no respiratory distress and not coughing   Neuro: neuro: no gross deficits   Skin: skin: no discoloration or lesions of concern on visible areas   Due to this being a TeleHealth evaluation, many elements of the physical examination are unable to be assessed. Assessment and Plan:   Time-based coding, delete if not needed: I spent at least 15 minutes with this established patient, and >50% of the time was spent counseling and/or coordinating care regarding erectile dysfunction and stable chronic joint pain    Erich Valerio is a 73 y.o. male with erectile dysfunction, would like to try Viagra, and would like refill of diclofenac which works well for chronic pain    1. Erectile dysfunction, unspecified erectile dysfunction type - counseled pt on various treatment options, pt would like to try medication, pt to take 1/2 tab for first dose and if no side effects can try 50mg full tab if needed  -START sildenafil citrate (VIAGRA) 50 mg tablet; Take 1 Tablet by mouth as needed for Erectile Dysfunction. Take 1 hour before sexual activity. Dispense: 30 Tablet; Refill: 0  - METABOLIC PANEL, COMPREHENSIVE; Future    2. Chronic right shoulder pain and bilateral knee pain - stable  -REFILLED diclofenac EC (VOLTAREN) 50 mg EC tablet; Take 1 Tablet by mouth two (2) times daily as needed for Pain. Dispense: 60 Tablet; Refill: 2    3.  Testosterone deficiency - reports hx of testosterone deficiency, was on testosterone cream in the past, will need to check early morning lab, pt to come in at 8am Monday morning next week  - TESTOSTERONE, TOTAL, ADULT MALE; Future    4. Acquired hypothyroidism - well controlled, due for TSH  Lab Results   Component Value Date/Time    TSH 2.24 09/08/2020 09:58 AM     - TSH 3RD GENERATION; Future    5. Hyperlipidemia, unspecified hyperlipidemia type - pt with muscle cramps with multiple statins, not currently on treatment but would like to get lipid panel rechecked and will consider trying treatment again based on results, Zetia was too expensive  Lab Results   Component Value Date/Time    LDL, calculated 141.2 (H) 09/08/2020 89:88 AM     - METABOLIC PANEL, COMPREHENSIVE; Future  - LIPID PANEL; Future        Time spent in direct conversation with the patient to include medical condition(s) discussed, assessment and treatment plan:       We discussed the expected course, resolution and complications of the diagnosis(es) in detail. Medication risks, benefits, costs, interactions, and alternatives were discussed as indicated. I advised him to contact the office if his condition worsens, changes or fails to improve as anticipated. He expressed understanding with the diagnosis(es) and plan. Patient understands that this encounter was a temporary measure, and the importance of further follow up and examination was emphasized. Patient verbalized understanding. Patient informed to follow up: Follow-up and Dispositions  ·   Return in about 1 month (around 7/4/2021), or if symptoms worsen or fail to improve, for Medicare wellness visit. Routing History          Electronically Signed: Marcio Gray MD    Providers location when delivering service (clinic, hospital, home): Home      ICD-10-CM ICD-9-CM    1. Erectile dysfunction, unspecified erectile dysfunction type  N52.9 607.84 sildenafil citrate (VIAGRA) 50 mg tablet      METABOLIC PANEL, COMPREHENSIVE   2. Chronic right shoulder pain  M25.511 719.41 diclofenac EC (VOLTAREN) 50 mg EC tablet    G89.29 338.29    3.  Muscle strain T14. 8XXA 848.9 diclofenac EC (VOLTAREN) 50 mg EC tablet   4. Chronic pain of both knees  M25.561 719.46 diclofenac EC (VOLTAREN) 50 mg EC tablet    M25.562 338.29     G89.29     5. Testosterone deficiency  E34.9 259.9 TESTOSTERONE, TOTAL, ADULT MALE   6. Fatigue, unspecified type  R53.83 780.79 TESTOSTERONE, TOTAL, ADULT MALE   7. Acquired hypothyroidism  E03.9 244.9 TSH 3RD GENERATION   8. Hyperlipidemia, unspecified hyperlipidemia type  K50.4 898.5 METABOLIC PANEL, COMPREHENSIVE      LIPID PANEL         Pursuant to the emergency declaration under the [facility], [facility] waiver authority and the [employer] Act, this Virtual  Visit was conducted, with patient's consent, to reduce the patient's risk of exposure to COVID-19 and provide continuity of care for an established patient. Services were provided through a video synchronous discussion virtually to substitute for in-person clinic visit. History   Patients past medical, surgical and family histories were reviewed.     Past Medical History:   Diagnosis Date    Depression     Hypercholesterolemia     Skin cancer     Thyroid disease      Past Surgical History:   Procedure Laterality Date    HX HEENT  1998    subcutaneous cyst removed from head    HX HEENT  1999    skin cancer removed from face    HX TONSIL AND ADENOIDECTOMY       Family History   Problem Relation Age of Onset    Alcohol abuse Mother     Dementia Mother     Suicide Father     Other Brother         rnp1 protein disorder with elevated inflamatory markers     Social History     Socioeconomic History    Marital status:      Spouse name: Not on file    Number of children: Not on file    Years of education: Not on file    Highest education level: Not on file   Occupational History    Not on file   Tobacco Use    Smoking status: Former Smoker     Packs/day: 1.00     Years: 6.00     Pack years: 6.00     Types: Cigarettes    Smokeless tobacco: Never Used   Substance and Sexual Activity    Alcohol use: Not Currently    Drug use: Not on file    Sexual activity: Yes     Partners: Female   Other Topics Concern    Not on file   Social History Narrative    Not on file     Social Determinants of Health     Financial Resource Strain:     Difficulty of Paying Living Expenses:    Food Insecurity:     Worried About Running Out of Food in the Last Year:     [address] in the Last Year:    Transportation Needs:     Lack of Transportation (Medical):  Lack of Transportation (Non-Medical):    Physical Activity:     Days of Exercise per Week:     Minutes of Exercise per Session:    Stress:     Feeling of Stress :    Social Connections:     Frequency of Communication with Friends and Family:     Frequency of Social Gatherings with Friends and Family:     Attends Synagogue Services:     Active Member of Clubs or Organizations:     Attends Club or Organization Meetings:     Marital Status:    Intimate Partner Violence:     Fear of Current or Ex-Partner:     Emotionally Abused:     Physically Abused:     Sexually Abused:      Patient Active Problem List   Diagnosis Code    Hyperlipidemia E78.5    Acquired hypothyroidism E03.9    Chronic right shoulder pain M25.511, G89.29    Astigmatism H52.209    Former smoker Z87.891    Digital mucous cyst M67.449    Chronic pain of both knees M25.561, M25.562, G89.29    Hx of basal cell carcinoma Z85.828    Erectile dysfunction N52.9          Current Medications/Allergies   Medications and Allergies reviewed:    Current Outpatient Medications   Medication Sig Dispense Refill    sildenafil citrate (VIAGRA) 50 mg tablet Take 1 Tablet by mouth as needed for Erectile Dysfunction. Take 1 hour before sexual activity. 30 Tablet 0    diclofenac EC (VOLTAREN) 50 mg EC tablet Take 1 Tablet by mouth two (2) times daily as needed for Pain.  60 Tablet 2  [place] Thyroid 90 mg tablet TAKE 1 TABLET BY MOUTH DAILY 90 Tab 1    cyclobenzaprine (FLEXERIL) 5 mg tablet Take 1 Tab by mouth three (3) times daily as needed for Muscle Spasm(s). 30 Tab 0    meclizine (ANTIVERT) 25 mg tablet       salicylic acid 17 % topical gel Soak wart in warm water for 5 minutes. Dry area thoroughly. Apply 1 drop to cover wart. Let dry. Repeat once or twice daily until wart is removed for up to 12 weeks.  1 Tube 1    OTHER Hammer hemp 0.0%THC       Allergies   Allergen Reactions    Atorvastatin Myalgia    Crestor [Rosuvastatin] Myalgia    Zetia [Ezetimibe] Myalgia     \"muscle cramps\"

## 2021-06-07 ENCOUNTER — LAB ONLY (OUTPATIENT)
Dept: FAMILY MEDICINE CLINIC | Age: 71
End: 2021-06-07

## 2021-06-07 DIAGNOSIS — R53.83 FATIGUE, UNSPECIFIED TYPE: ICD-10-CM

## 2021-06-07 DIAGNOSIS — E03.9 ACQUIRED HYPOTHYROIDISM: ICD-10-CM

## 2021-06-07 DIAGNOSIS — E34.9 TESTOSTERONE DEFICIENCY: ICD-10-CM

## 2021-06-07 DIAGNOSIS — E78.5 HYPERLIPIDEMIA, UNSPECIFIED HYPERLIPIDEMIA TYPE: ICD-10-CM

## 2021-06-07 DIAGNOSIS — N52.9 ERECTILE DYSFUNCTION, UNSPECIFIED ERECTILE DYSFUNCTION TYPE: ICD-10-CM

## 2021-06-08 LAB
ALBUMIN SERPL-MCNC: 4.1 G/DL (ref 3.5–5)
ALBUMIN/GLOB SERPL: 1.5 {RATIO} (ref 1.1–2.2)
ALP SERPL-CCNC: 48 U/L (ref 45–117)
ALT SERPL-CCNC: 38 U/L (ref 12–78)
ANION GAP SERPL CALC-SCNC: 6 MMOL/L (ref 5–15)
AST SERPL-CCNC: 31 U/L (ref 15–37)
BILIRUB SERPL-MCNC: 0.6 MG/DL (ref 0.2–1)
BUN SERPL-MCNC: 16 MG/DL (ref 6–20)
BUN/CREAT SERPL: 18 (ref 12–20)
CALCIUM SERPL-MCNC: 9.4 MG/DL (ref 8.5–10.1)
CHLORIDE SERPL-SCNC: 108 MMOL/L (ref 97–108)
CHOLEST SERPL-MCNC: 206 MG/DL
CO2 SERPL-SCNC: 26 MMOL/L (ref 21–32)
CREAT SERPL-MCNC: 0.87 MG/DL (ref 0.7–1.3)
GLOBULIN SER CALC-MCNC: 2.8 G/DL (ref 2–4)
GLUCOSE SERPL-MCNC: 92 MG/DL (ref 65–100)
HDLC SERPL-MCNC: 52 MG/DL
HDLC SERPL: 4 {RATIO} (ref 0–5)
LDLC SERPL CALC-MCNC: 131.4 MG/DL (ref 0–100)
POTASSIUM SERPL-SCNC: 5.2 MMOL/L (ref 3.5–5.1)
PROT SERPL-MCNC: 6.9 G/DL (ref 6.4–8.2)
SODIUM SERPL-SCNC: 140 MMOL/L (ref 136–145)
TRIGL SERPL-MCNC: 113 MG/DL (ref ?–150)
TSH SERPL DL<=0.05 MIU/L-ACNC: 0.34 UIU/ML (ref 0.36–3.74)
VLDLC SERPL CALC-MCNC: 22.6 MG/DL

## 2021-06-08 NOTE — PROGRESS NOTES
[address] Medicine Residency Attending Addendum:  Dr. Wild Mcdaniel MD,  the patient and I were not physically present during this encounter. The resident and I are concurrently monitoring the patient care through appropriate telecommunication technology. I discussed the findings, assessment and plan with the resident and agree with the resident's findings and plan as documented in the resident's note.       Ben Ramey MD

## 2021-06-09 LAB
COMMENT, TESC2: NORMAL
TESTOST SERPL-MCNC: 554 NG/DL (ref 264–916)

## 2021-06-10 NOTE — PROGRESS NOTES
Results noted. Testosterone normal. LDL improved to 131. K borderline at 5.2. TSH borderline at 0.34. Can recheck in 2-3 months and adjust thyroid medication if still low.      Sandy Bolivar MD  PGY3 Family Medicine Resident

## 2021-08-24 ENCOUNTER — VIRTUAL VISIT (OUTPATIENT)
Dept: FAMILY MEDICINE CLINIC | Age: 71
End: 2021-08-24
Payer: COMMERCIAL

## 2021-08-24 DIAGNOSIS — Z23 NEED FOR COVID-19 VACCINE: Primary | ICD-10-CM

## 2021-08-24 DIAGNOSIS — E78.2 MIXED HYPERLIPIDEMIA: ICD-10-CM

## 2021-08-24 PROCEDURE — 99214 OFFICE O/P EST MOD 30 MIN: CPT | Performed by: STUDENT IN AN ORGANIZED HEALTH CARE EDUCATION/TRAINING PROGRAM

## 2021-08-24 RX ORDER — PRAVASTATIN SODIUM 40 MG/1
40 TABLET ORAL
Qty: 30 TABLET | Refills: 2 | Status: SHIPPED | OUTPATIENT
Start: 2021-08-24 | End: 2022-04-01 | Stop reason: SDUPTHER

## 2021-08-24 NOTE — LETTER
NOTIFICATION OF WORK ACCOMODATIONS    8/24/2021 5:50 PM    Mr. David [place] [id number]      To Whom It May Concern:    Karina Montalvo is currently under the care of Xavier Christine. It is medically unadvisable for Karina Montalvo to continuously wear an N95 greater than 1 hour at a time. Please allow a break from this as able. He may wear a surgical mask continuously unhindered. If there are questions or concerns please have the patient contact our office.       Sincerely,      Tory Solares MD

## 2021-08-24 NOTE — PROGRESS NOTES
Yenny Kramer (: 1950) is a 73 y.o. male, established patient, here for evaluation of the following chief complaint(s):   Concern For COVID-19 (Coronavirus)       ASSESSMENT/PLAN:  1. Need for COVID-19 vaccine- Discussed risks and benefits of COVID vaccination. Pt requesting medications at home in case he got covid. Discussed no indication or FDA approval for hydroxychloraquine, Ivermectin or supplements. Recommended getting COVID vaccine. Pfizer and DIRECTV vaccines not produced from fetal stem cell so should not have a [employer] objection. 2. Mixed hyperlipidemia- Intolerant of statins previously. Will try low dose pravastatin for ASCVD risk 15%  -     pravastatin (PRAVACHOL) 40 mg tablet; Take 1 Tablet by mouth nightly., Normal, Disp-30 Tablet, R-2      Return if symptoms worsen or fail to improve. 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 It has given him a nasal callous and during the day a postnasal drip which irritates his throat. On days when he does not wear one, he denies postnasal drip or sore throat. He is concerned that the postnasal drip is due an autoimmune disease as his brother who is a hospitalist has a autoimmune disease. Covid vaccination- Morally and religiously opposed to COVID due to an understanding that they were made and produced using fetal stem cells    HLD- ASCVD 15%. Intolerant of many previous statins  The 10-year ASCVD risk score (Nancy Valle., et al., 2013) is: 15%    Values used to calculate the score:      Age: 73 years      Sex: Male      Is Non- : No      Diabetic: No      Tobacco smoker: No      Systolic Blood Pressure: 610 mmHg      Is BP treated: No      HDL Cholesterol: 52 MG/DL      Total Cholesterol: 206 MG/DL      Review of Systems   Constitutional: Negative for chills and fever. HENT: Positive for rhinorrhea. Respiratory: Negative for cough and shortness of breath. Gastrointestinal: Negative for nausea and vomiting. Genitourinary: Negative for dysuria and frequency. Neurological: Negative for dizziness and headaches. No flowsheet data found.   [INSTRUCTIONS:  \"[x]\" Indicates a positive item  \"[]\" Indicates a negative item  -- DELETE ALL ITEMS NOT EXAMINED]    Constitutional: [x] Appears well-developed and well-nourished [x] No apparent distress      [] Abnormal -     Mental status: [x] Alert and awake  [x] Oriented to person/place/time [x] Able to follow commands    [] Abnormal -     Eyes:   EOM    [x]  Normal    [] Abnormal -   Sclera  [x]  Normal    [] Abnormal -          Discharge [x]  None visible   [] Abnormal -     HENT: [x] Normocephalic, atraumatic  [] Abnormal -   [x] Mouth/Throat: Mucous membranes are moist    External Ears [x] Normal  [] Abnormal -    Neck: [x] No visualized mass [] Abnormal -     Pulmonary/Chest: [x] Respiratory effort normal   [x] No visualized signs of difficulty breathing or respiratory distress        [] Abnormal -      Musculoskeletal:   [x] Normal gait with no signs of ataxia         [x] Normal range of motion of neck        [] Abnormal -     Neurological:        [x] No Facial Asymmetry (Cranial nerve 7 motor function) (limited exam due to video visit)          [x] No gaze palsy        [] Abnormal -          Skin:        [x] No significant exanthematous lesions or discoloration noted on facial skin         [] Abnormal -            Psychiatric:       [x] Normal Affect [] Abnormal -        [x] No Hallucinations    Other pertinent observable physical exam findings:-        Current Outpatient Medications:     pravastatin (PRAVACHOL) 40 mg tablet, Take 1 Tablet by mouth nightly., Disp: 30 Tablet, Rfl: 2    [place] Thyroid 90 mg tablet, TAKE 1 TABLET BY MOUTH DAILY, Disp: 90 Tablet, Rfl: 0    sildenafil citrate (VIAGRA) 50 mg tablet, Take 1 Tablet by mouth as needed for Erectile Dysfunction. Take 1 hour before sexual activity. , Disp: 30 Tablet, Rfl: 0    diclofenac EC (VOLTAREN) 50 mg EC tablet, Take 1 Tablet by mouth two (2) times daily as needed for Pain., Disp: 60 Tablet, Rfl: 2    cyclobenzaprine (FLEXERIL) 5 mg tablet, Take 1 Tab by mouth three (3) times daily as needed for Muscle Spasm(s). , Disp: 30 Tab, Rfl: 0    meclizine (ANTIVERT) 25 mg tablet, , Disp: , Rfl:     salicylic acid 17 % topical gel, Soak wart in warm water for 5 minutes. Dry area thoroughly. Apply 1 drop to cover wart. Let dry. Repeat once or twice daily until wart is removed for up to 12 weeks. , Disp: 1 Tube, Rfl: 1    OTHER, Hammer hemp 0.0%THC, Disp: , Rfl:         Robyn Padron, was evaluated through a synchronous (real-time) audio-video encounter. The patient (or guardian if applicable) is aware that this is a billable service. Verbal consent to proceed has been obtained within the past 12 months. The visit was conducted pursuant to the emergency declaration under the [facility], [address] [place] [address] authority and the [employer] Act. Patient identification was verified, and a caregiver was present when appropriate. The patient was located in a state where the provider was credentialed to provide care. An electronic signature was used to authenticate this note.   -- Sonia Kitchen MD

## 2021-08-27 ENCOUNTER — PATIENT MESSAGE (OUTPATIENT)
Dept: FAMILY MEDICINE CLINIC | Age: 71
End: 2021-08-27

## 2021-08-27 DIAGNOSIS — R53.83 FATIGUE, UNSPECIFIED TYPE: Primary | ICD-10-CM

## 2021-09-01 NOTE — PROGRESS NOTES
[address] Medicine Residency Attending Addendum:  Dr. Mildred Villarreal MD,  the patient and I were not physically present during this encounter. The resident and I are concurrently monitoring the patient care through appropriate telecommunication technology. I discussed the findings, assessment and plan with the resident and agree with the resident's findings and plan as documented in the resident's note.       Taryn Wang MD

## 2021-09-07 NOTE — TELEPHONE ENCOUNTER
Ean Corona, LPN 0/4/9718 2:68 AM EDT      ----- Message -----  From: Robyn Padron  Sent: 9/1/2021 5:43 AM EDT  To: [facility] Nurse Pool  Subject: RE: Update Medical Information     Thank You Dr Katelyn Hutton! Will be in next week for blood draw  --  Appreciate additional vax info too  --  Lupus symptoms? Cold Hands / Feet  Aches like a cold coming on that mysterious come / go  Energy loss despite sleeping well and general good health  Loss of libido  --  Based on relatives diagnosis a screen is requested. --  Thanks for help with mask letter    Kwame Castro  ----- Message -----  From: Sonia Kitchen MD  Sent: 8/31/21, 4:35 PM  To: Robyn Padron  Subject: RE: Update Medical Information    I believe I just figured out how to send the work note to you. That should be in [address] I also placed an order for the blood antibody test. You can come into clinic for that. Further research has shown that [employer] and [employer] vaccines have not been produced from fetal cells. Original research did use fetal cell lines which were from the 1980's. The original cells have been grown over decades. However this was not used in the manufacturing of the vaccine. If you have any other [employer] concerns regarding the vaccine please let me know. In regards to the autoimmune testing for Lupus which it appears is what your family member was diagnosed with. I believe the only symptom we spoke about was the runny nose assoicated with mask wearing that isnt there without the mask. That would not be an indication for Lupus testing. Again I am concerned about the high false positive rates of some of these tests if we test without significant symptoms. We may go down an unnecessary rabbit hole.    If you are concerned for more of Lupus type symptoms we should talk more about that during another visit.       ----- Message -----   From:Caden Cazares   Sent:8/27/2021 11:20 AM EDT   Velta Seashore, MD [address] Tali Mccurdy    Any more thoughts on the auto immune angle to my symptoms & tests my brother noted? Can I get a Covid anti-body test RX? Just a blood draw?     Thanks  Souleymane Samayoa

## 2021-09-10 LAB — DIASORIN SARS-COV-2 AB, IGG, RCVG3T: NEGATIVE

## 2021-10-25 DIAGNOSIS — E03.9 ACQUIRED HYPOTHYROIDISM: ICD-10-CM

## 2021-10-26 DIAGNOSIS — E03.9 ACQUIRED HYPOTHYROIDISM: Primary | ICD-10-CM

## 2021-10-26 RX ORDER — LEVOTHYROXINE AND LIOTHYRONINE 57; 13.5 UG/1; UG/1
90 TABLET ORAL DAILY
Qty: 90 TABLET | Refills: 0 | Status: SHIPPED | OUTPATIENT
Start: 2021-10-26 | End: 2021-11-23 | Stop reason: SDUPTHER

## 2021-10-26 NOTE — TELEPHONE ENCOUNTER
Please call the pt and have him come in to recheck his TSH. Dr. Pastor Gonzalez wanted this rechecked after 2-3 months from his last one to make sure it was in the correct range.    I'm placing the order now

## 2021-11-23 DIAGNOSIS — E03.9 ACQUIRED HYPOTHYROIDISM: ICD-10-CM

## 2021-11-23 DIAGNOSIS — M25.561 CHRONIC PAIN OF BOTH KNEES: ICD-10-CM

## 2021-11-23 DIAGNOSIS — G89.29 CHRONIC PAIN OF BOTH KNEES: ICD-10-CM

## 2021-11-23 DIAGNOSIS — G89.29 CHRONIC RIGHT SHOULDER PAIN: ICD-10-CM

## 2021-11-23 DIAGNOSIS — T14.8XXA MUSCLE STRAIN: ICD-10-CM

## 2021-11-23 DIAGNOSIS — M25.562 CHRONIC PAIN OF BOTH KNEES: ICD-10-CM

## 2021-11-23 DIAGNOSIS — M25.511 CHRONIC RIGHT SHOULDER PAIN: ICD-10-CM

## 2021-11-23 RX ORDER — DICLOFENAC SODIUM 50 MG/1
50 TABLET, DELAYED RELEASE ORAL
Qty: 60 TABLET | Refills: 2 | Status: CANCELLED | OUTPATIENT
Start: 2021-11-23

## 2021-11-23 NOTE — TELEPHONE ENCOUNTER
Pt needs a refill of ([place] Thyroid) refaxed over to [phone] for a 90 day supply. Pt needs a temporary Rx of [place] Thyroid sent to   [facility] in [address] [place] [address]. He is out of this Rx completely and the mailed Rx will take 2 to 3 weeks. Pt would also like to have a Rx of Diclofenac EC sent to [facility] as well. Pt is scheduled for Labs tomorrow to check thyroid.

## 2021-11-26 RX ORDER — DICLOFENAC SODIUM 50 MG/1
50 TABLET, DELAYED RELEASE ORAL
Qty: 60 TABLET | Refills: 2 | Status: SHIPPED | OUTPATIENT
Start: 2021-11-26 | End: 2022-02-07 | Stop reason: SDUPTHER

## 2021-11-29 RX ORDER — LEVOTHYROXINE AND LIOTHYRONINE 57; 13.5 UG/1; UG/1
90 TABLET ORAL DAILY
Qty: 30 TABLET | Refills: 0 | Status: SHIPPED | OUTPATIENT
Start: 2021-11-29 | End: 2022-05-26 | Stop reason: SDUPTHER

## 2021-11-29 RX ORDER — LEVOTHYROXINE AND LIOTHYRONINE 57; 13.5 UG/1; UG/1
90 TABLET ORAL DAILY
Qty: 90 TABLET | Refills: 1 | Status: SHIPPED | OUTPATIENT
Start: 2021-11-29 | End: 2021-12-22 | Stop reason: SDUPTHER

## 2021-12-22 DIAGNOSIS — E03.9 ACQUIRED HYPOTHYROIDISM: ICD-10-CM

## 2021-12-22 RX ORDER — LEVOTHYROXINE AND LIOTHYRONINE 57; 13.5 UG/1; UG/1
90 TABLET ORAL DAILY
Qty: 90 TABLET | Refills: 1 | Status: SHIPPED | OUTPATIENT
Start: 2021-12-22 | End: 2022-06-21

## 2021-12-22 NOTE — TELEPHONE ENCOUNTER
Patient came into office stating that he was advised by Db Samuel that they never received prescription for [place] Thyroid. I called to pharmacy and they have rx on file but do not accept patients insurance. Patient was advised to call his insurance to check what mailorder pharmacy is in network. He is requesting 90 day supply to be sent to local pharmacy for now.

## 2022-02-06 ENCOUNTER — PATIENT MESSAGE (OUTPATIENT)
Dept: FAMILY MEDICINE CLINIC | Age: 72
End: 2022-02-06

## 2022-02-06 DIAGNOSIS — T14.8XXA MUSCLE STRAIN: ICD-10-CM

## 2022-02-06 DIAGNOSIS — M25.561 CHRONIC PAIN OF BOTH KNEES: ICD-10-CM

## 2022-02-06 DIAGNOSIS — G89.29 CHRONIC PAIN OF BOTH KNEES: ICD-10-CM

## 2022-02-06 DIAGNOSIS — G89.29 CHRONIC RIGHT SHOULDER PAIN: ICD-10-CM

## 2022-02-06 DIAGNOSIS — M25.511 CHRONIC RIGHT SHOULDER PAIN: ICD-10-CM

## 2022-02-06 DIAGNOSIS — M25.562 CHRONIC PAIN OF BOTH KNEES: ICD-10-CM

## 2022-02-08 RX ORDER — DICLOFENAC SODIUM 50 MG/1
50 TABLET, DELAYED RELEASE ORAL
Qty: 60 TABLET | Refills: 2 | Status: SHIPPED | OUTPATIENT
Start: 2022-02-08 | End: 2022-02-22 | Stop reason: SDUPTHER

## 2022-02-22 ENCOUNTER — OFFICE VISIT (OUTPATIENT)
Dept: FAMILY MEDICINE CLINIC | Age: 72
End: 2022-02-22
Payer: COMMERCIAL

## 2022-02-22 VITALS
SYSTOLIC BLOOD PRESSURE: 122 MMHG | TEMPERATURE: 98.6 F | RESPIRATION RATE: 16 BRPM | DIASTOLIC BLOOD PRESSURE: 78 MMHG | WEIGHT: 187 LBS | HEART RATE: 65 BPM | HEIGHT: 71 IN | OXYGEN SATURATION: 98 % | BODY MASS INDEX: 26.18 KG/M2

## 2022-02-22 DIAGNOSIS — E03.9 ACQUIRED HYPOTHYROIDISM: Primary | ICD-10-CM

## 2022-02-22 DIAGNOSIS — E78.00 ELEVATED CHOLESTEROL: ICD-10-CM

## 2022-02-22 DIAGNOSIS — G89.29 CHRONIC PAIN OF BOTH KNEES: ICD-10-CM

## 2022-02-22 DIAGNOSIS — M25.561 CHRONIC PAIN OF BOTH KNEES: ICD-10-CM

## 2022-02-22 DIAGNOSIS — M25.562 CHRONIC PAIN OF BOTH KNEES: ICD-10-CM

## 2022-02-22 PROCEDURE — G8536 NO DOC ELDER MAL SCRN: HCPCS | Performed by: STUDENT IN AN ORGANIZED HEALTH CARE EDUCATION/TRAINING PROGRAM

## 2022-02-22 PROCEDURE — 1101F PT FALLS ASSESS-DOCD LE1/YR: CPT | Performed by: STUDENT IN AN ORGANIZED HEALTH CARE EDUCATION/TRAINING PROGRAM

## 2022-02-22 PROCEDURE — 3017F COLORECTAL CA SCREEN DOC REV: CPT | Performed by: STUDENT IN AN ORGANIZED HEALTH CARE EDUCATION/TRAINING PROGRAM

## 2022-02-22 PROCEDURE — G8510 SCR DEP NEG, NO PLAN REQD: HCPCS | Performed by: STUDENT IN AN ORGANIZED HEALTH CARE EDUCATION/TRAINING PROGRAM

## 2022-02-22 PROCEDURE — G8428 CUR MEDS NOT DOCUMENT: HCPCS | Performed by: STUDENT IN AN ORGANIZED HEALTH CARE EDUCATION/TRAINING PROGRAM

## 2022-02-22 PROCEDURE — G8419 CALC BMI OUT NRM PARAM NOF/U: HCPCS | Performed by: STUDENT IN AN ORGANIZED HEALTH CARE EDUCATION/TRAINING PROGRAM

## 2022-02-22 RX ORDER — DICLOFENAC SODIUM 50 MG/1
50 TABLET, DELAYED RELEASE ORAL
Qty: 60 TABLET | Refills: 2 | Status: SHIPPED | OUTPATIENT
Start: 2022-02-22

## 2022-02-22 NOTE — PROGRESS NOTES
Georgi Avila (: 1950) is a 81 y.o. male, established patient, here for evaluation of the following chief complaint(s):  Complete Physical       Assessment/Plan:  1. Acquired hypothyroidism-check in 2021 with slight hypothyroid. Had missed a few dosage of thyroid medication so elected to improve compliance but keep the dose the same. Given cold intolerance we will recheck today. Instructed on not taking this medication with food. -     TSH 3RD GENERATION; Future  2. Chronic pain of both knees-no history of renal impairment. Taking as needed. We will continue with lowest possible dose.  -     diclofenac EC (VOLTAREN) 50 mg EC tablet; Take 1 Tablet by mouth two (2) times daily as needed for Pain., Normal, Disp-60 Tablet, R-2  3. Elevated cholesterol-patient concerned about possible cardiac risk factors. Discussed elevated ASCVD risk score primarily from his age and cholesterol. Patient elected to not pursue statin therapy at this time and wants to focus on diet and exercise. However is interested in cardiac calcium score. -     CT HEART W/O CONT WITH CALCIUM; Future    Return in about 3 months (around 2022). Subjective/Objective:  HPI  CVD screening: Patient concerned about modifiable cardiac risk factors. Overall he is in good health. Possible improvement could lie in aerobic activity. Patient is very active but not as much sustained aerobic activity. Has had intolerance of statins in the past and does not want to try again. The 10-year ASCVD risk score (Lokesh Laws., et al., 2013) is: 16.7%    Values used to calculate the score:      Age: 81 years      Sex: Male      Is Non- : No      Diabetic: No      Tobacco smoker: No      Systolic Blood Pressure: 120 mmHg      Is BP treated: No      HDL Cholesterol: 52 MG/DL      Total Cholesterol: 206 MG/DL    Lightheadedness: Mostly just in the morning. Not dizziness. Seems to improve as he gets going in for the day. Previously with severe vertigo at the same time of his shingles. ENT physician stated that those 2 might have been related. This is not as severe as it was at that time. Cold intolerance: Worse a couple days a week. House set at normal temp but having difficulty feeling warm. Taking thyroid medicine every morning at the same time with breakfast.  This problem has been going on for about a year. Denies Raynaud's phenomenon. Physical Exam  Blood pressure 122/78, pulse 65, temperature 98.6 °F (37 °C), temperature source Oral, resp. rate 16, height 5' 11\" (1.803 m), weight 187 lb (84.8 kg), SpO2 98 %. Body mass index is 26.08 kg/m². General appearance - Alert, NAD. Head - Atraumatic. Normocephalic. No lymphadenopathy  Eyes - EOMI. Sclera white. Ears - Hearing grossly normal.    Nose - Nares patent, no polyps  Throat - pharynx clear, no exudates. Respiratory - LCTAB. No wheeze/rale/rhonchi  Heart - Normal rate, regular rhythm. No m/r/r  Abdomen - Soft, non tender. Non distended. Neurological - No focal deficits. Speech normal.   Musculoskeletal - Normal ROM, Gait normal.    Extremities - No LE edema. Skin - normal coloration and normal turgor. No cyanosis, no rash. Medical History- Reviewed Social History- Reviewed Surgical History- Reviewed   Karissa Meza has a past medical history of Depression, Hypercholesterolemia, Skin cancer, and Thyroid disease. Karissa Meza reports that he has quit smoking. His smoking use included cigarettes. He has a 6.00 pack-year smoking history. He has never used smokeless tobacco. He reports previous alcohol use. Karissa Meza has a past surgical history that includes hx heent (1998); hx heent (1999); and hx tonsil and adenoidectomy.        Problem List- Reviewed   Karissa Meza has Hyperlipidemia, Acquired hypothyroidism, Chronic right shoulder pain, Astigmatism, Former smoker, Digital mucous cyst, Chronic pain of both knees, Hx of basal cell carcinoma, and Erectile dysfunction on their problem list.     Current Outpatient Medications   Medication Instructions    diclofenac EC (VOLTAREN) 50 mg, Oral, 2 TIMES DAILY AS NEEDED    meclizine (ANTIVERT) 25 mg tablet No dose, route, or frequency recorded.  OTHER Hammer hemp 0.0%THC     pravastatin (PRAVACHOL) 40 mg, Oral, EVERY BEDTIME    salicylic acid 17 % topical gel Soak wart in warm water for 5 minutes. Dry area thoroughly. Apply 1 drop to cover wart. Let dry. Repeat once or twice daily until wart is removed for up to 12 weeks.  thyroid (Pork) (ARMOUR THYROID) 90 mg, Oral, DAILY    thyroid (Pork) (ARMOUR THYROID) 90 mg, Oral, DAILY           An electronic signature was used to authenticate this note.   -- Valdemar Ceja MD

## 2022-02-22 NOTE — PROGRESS NOTES
1. Have you been to the ER, urgent care clinic since your last visit? Hospitalized since your last visit? No    2. Have you seen or consulted any other health care providers outside of the [address] [place] [address] since your last visit? Include any pap smears or colon screening. No    Reviewed record in preparation for visit and have necessary documentation  Goals that were addressed and/or need to be completed during or after this appointment include     Health Maintenance Due   Topic Date Due    COVID-19 Vaccine (1) Never done    DTaP/Tdap/Td series (1 - Tdap) Never done    Colorectal Cancer Screening Combo  Never done    Shingrix Vaccine Age 50> (1 of 2) Never done    AAA Screening 73-69 YO Male Smoking Patients  Never done    Pneumococcal 65+ years (1 of 1 - PPSV23) Never done    Flu Vaccine (1) 09/01/2021    Depression Screen  11/10/2021       Patient is accompanied by self I have received verbal consent from Demetrio Soliman to discuss any/all medical information while they are present in the room.

## 2022-02-22 NOTE — PROGRESS NOTES
Opal Gonzales (: 1950) is a 81 y.o. male, established patient, here for evaluation of the following chief complaint(s):  Complete Physical       Assessment/Plan:  1. Chronic right shoulder pain  The following orders have not been finalized:  -     diclofenac EC (VOLTAREN) 50 mg EC tablet  2. Muscle strain  The following orders have not been finalized:  -     diclofenac EC (VOLTAREN) 50 mg EC tablet  3. Chronic pain of both knees  The following orders have not been finalized:  -     diclofenac EC (VOLTAREN) 50 mg EC tablet    No follow-ups on file. Subjective/Objective:  HPI  ***      Physical Exam  Blood pressure 122/78, pulse 65, temperature 98.6 °F (37 °C), temperature source Oral, resp. rate 16, height 5' 11\" (1.803 m), weight 187 lb (84.8 kg), SpO2 98 %. Body mass index is 26.08 kg/m². General appearance - Alert, NAD. Head - Atraumatic. Normocephalic. No lymphadenopathy  Eyes - EOMI. Sclera white. Ears - Hearing grossly normal.    Nose - Nares patent, no polyps  Throat - pharynx clear, no exudates. Respiratory - LCTAB. No wheeze/rale/rhonchi  Heart - Normal rate, regular rhythm. No m/r/r  Abdomen - Soft, non tender. Non distended. Neurological - No focal deficits. Speech normal.   Musculoskeletal - Normal ROM, Gait normal.    Extremities - No LE edema. Skin - normal coloration and normal turgor. No cyanosis, no rash. Medical History- Reviewed Social History- Reviewed Surgical History- Reviewed   Lizandro Wilson has a past medical history of Depression, Hypercholesterolemia, Skin cancer, and Thyroid disease. Lizandro Wilson reports that he has quit smoking. His smoking use included cigarettes. He has a 6.00 pack-year smoking history. He has never used smokeless tobacco. He reports previous alcohol use. Lizandro Wilson has a past surgical history that includes hx naida (); hx heent (); and hx tonsil and adenoidectomy.        Problem List- Reviewed   Lizandro Wilson has Hyperlipidemia, Acquired hypothyroidism, Chronic right shoulder pain, Astigmatism, Former smoker, Digital mucous cyst, Chronic pain of both knees, Hx of basal cell carcinoma, and Erectile dysfunction on their problem list.     Current Outpatient Medications   Medication Instructions    cyclobenzaprine (FLEXERIL) 5 mg, Oral, 3 TIMES DAILY AS NEEDED    diclofenac EC (VOLTAREN) 50 mg, Oral, 2 TIMES DAILY AS NEEDED    meclizine (ANTIVERT) 25 mg tablet No dose, route, or frequency recorded.  OTHER Hammer hemp 0.0%THC     pravastatin (PRAVACHOL) 40 mg, Oral, EVERY BEDTIME    salicylic acid 17 % topical gel Soak wart in warm water for 5 minutes. Dry area thoroughly. Apply 1 drop to cover wart. Let dry. Repeat once or twice daily until wart is removed for up to 12 weeks.  sildenafil citrate (VIAGRA) 50 mg, Oral, AS NEEDED, Take 1 hour before sexual activity.  thyroid (Pork) (ARMOUR THYROID) 90 mg, Oral, DAILY    thyroid (Pork) (ARMOUR THYROID) 90 mg, Oral, DAILY       {Time Documentation Optional:61374}    An electronic signature was used to authenticate this note.   -- Johny Ramos MD

## 2022-02-23 LAB — TSH SERPL DL<=0.05 MIU/L-ACNC: 2.74 UIU/ML (ref 0.36–3.74)

## 2022-03-18 PROBLEM — Z85.828 HX OF BASAL CELL CARCINOMA: Status: ACTIVE | Noted: 2021-05-21

## 2022-03-18 PROBLEM — E03.9 ACQUIRED HYPOTHYROIDISM: Status: ACTIVE | Noted: 2019-10-15

## 2022-03-18 PROBLEM — M25.562 CHRONIC PAIN OF BOTH KNEES: Status: ACTIVE | Noted: 2021-02-09

## 2022-03-18 PROBLEM — N52.9 ERECTILE DYSFUNCTION: Status: ACTIVE | Noted: 2021-06-04

## 2022-03-18 PROBLEM — M25.561 CHRONIC PAIN OF BOTH KNEES: Status: ACTIVE | Noted: 2021-02-09

## 2022-03-18 PROBLEM — G89.29 CHRONIC PAIN OF BOTH KNEES: Status: ACTIVE | Noted: 2021-02-09

## 2022-03-19 PROBLEM — E78.5 HYPERLIPIDEMIA: Status: ACTIVE | Noted: 2019-10-15

## 2022-03-19 PROBLEM — Z87.891 FORMER SMOKER: Status: ACTIVE | Noted: 2019-10-29

## 2022-03-19 PROBLEM — M67.449 DIGITAL MUCOUS CYST: Status: ACTIVE | Noted: 2020-10-15

## 2022-03-19 PROBLEM — H52.209 ASTIGMATISM: Status: ACTIVE | Noted: 2019-10-17

## 2022-03-20 PROBLEM — G89.29 CHRONIC RIGHT SHOULDER PAIN: Status: ACTIVE | Noted: 2019-10-15

## 2022-03-20 PROBLEM — M25.511 CHRONIC RIGHT SHOULDER PAIN: Status: ACTIVE | Noted: 2019-10-15

## 2022-03-25 ENCOUNTER — HOSPITAL ENCOUNTER (OUTPATIENT)
Dept: CT IMAGING | Age: 72
Discharge: HOME | End: 2022-03-25

## 2022-03-25 DIAGNOSIS — E78.00 ELEVATED CHOLESTEROL: ICD-10-CM

## 2022-03-25 PROCEDURE — 75571 CT HRT W/O DYE W/CA TEST: CPT

## 2022-04-01 ENCOUNTER — TELEPHONE (OUTPATIENT)
Dept: FAMILY MEDICINE CLINIC | Age: 72
End: 2022-04-01

## 2022-04-01 DIAGNOSIS — I25.10 CORONARY ARTERY DISEASE DUE TO LIPID RICH PLAQUE: Primary | ICD-10-CM

## 2022-04-01 DIAGNOSIS — I25.83 CORONARY ARTERY DISEASE DUE TO LIPID RICH PLAQUE: Primary | ICD-10-CM

## 2022-04-01 DIAGNOSIS — E78.2 MIXED HYPERLIPIDEMIA: ICD-10-CM

## 2022-04-01 RX ORDER — EZETIMIBE 10 MG/1
10 TABLET ORAL DAILY
Qty: 90 TABLET | Refills: 1 | Status: SHIPPED | OUTPATIENT
Start: 2022-04-01 | End: 2022-07-03

## 2022-04-01 RX ORDER — PRAVASTATIN SODIUM 40 MG/1
40 TABLET ORAL
Qty: 90 TABLET | Refills: 1 | Status: SHIPPED | OUTPATIENT
Start: 2022-04-01 | End: 2022-07-03

## 2022-04-01 NOTE — TELEPHONE ENCOUNTER
Called to discuss CT Heart results. Given 83percentile result, will be more aggressive at decreasing ASCVD risk. Pt amendable to retrial of Pravastatin and Zetia. Discussed risks/benefits and will start daily ASA 81mg. Also, with no history of Stress test in >30yrs, will set up for exercise stress test to rule out obstructive lesions.      Yuan Theodore MD

## 2022-05-26 ENCOUNTER — OFFICE VISIT (OUTPATIENT)
Dept: FAMILY MEDICINE CLINIC | Age: 72
End: 2022-05-26
Payer: COMMERCIAL

## 2022-05-26 VITALS
OXYGEN SATURATION: 100 % | WEIGHT: 174.8 LBS | HEART RATE: 64 BPM | HEIGHT: 71 IN | DIASTOLIC BLOOD PRESSURE: 67 MMHG | RESPIRATION RATE: 16 BRPM | BODY MASS INDEX: 24.47 KG/M2 | SYSTOLIC BLOOD PRESSURE: 124 MMHG | TEMPERATURE: 98 F

## 2022-05-26 DIAGNOSIS — S20.469A: Primary | ICD-10-CM

## 2022-05-26 DIAGNOSIS — E78.5 HYPERLIPIDEMIA, UNSPECIFIED HYPERLIPIDEMIA TYPE: ICD-10-CM

## 2022-05-26 PROCEDURE — G8536 NO DOC ELDER MAL SCRN: HCPCS | Performed by: FAMILY MEDICINE

## 2022-05-26 PROCEDURE — 99213 OFFICE O/P EST LOW 20 MIN: CPT | Performed by: FAMILY MEDICINE

## 2022-05-26 PROCEDURE — G8420 CALC BMI NORM PARAMETERS: HCPCS | Performed by: FAMILY MEDICINE

## 2022-05-26 PROCEDURE — 1123F ACP DISCUSS/DSCN MKR DOCD: CPT | Performed by: FAMILY MEDICINE

## 2022-05-26 PROCEDURE — 1101F PT FALLS ASSESS-DOCD LE1/YR: CPT | Performed by: FAMILY MEDICINE

## 2022-05-26 PROCEDURE — G8427 DOCREV CUR MEDS BY ELIG CLIN: HCPCS | Performed by: FAMILY MEDICINE

## 2022-05-26 PROCEDURE — 3017F COLORECTAL CA SCREEN DOC REV: CPT | Performed by: FAMILY MEDICINE

## 2022-05-26 PROCEDURE — G8510 SCR DEP NEG, NO PLAN REQD: HCPCS | Performed by: FAMILY MEDICINE

## 2022-05-26 RX ORDER — DOXYCYCLINE 100 MG/1
100 CAPSULE ORAL 2 TIMES DAILY
Qty: 20 CAPSULE | Refills: 0 | Status: SHIPPED | OUTPATIENT
Start: 2022-05-26

## 2022-05-26 NOTE — PROGRESS NOTES
1. Have you been to the ER, urgent care clinic since your last visit? Hospitalized since your last visit? No    2. Have you seen or consulted any other health care providers outside of the [address] [place] [address] since your last visit? Include any pap smears or colon screening. No    3. For patients aged 39-70: Has the patient had a colonoscopy / FIT/ Cologuard? 2020/2021 [facility]     If the patient is female:    4. For patients aged 41-77: Has the patient had a mammogram within the past 2 years? NA - based on age or sex      11. For patients aged 21-65: Has the patient had a pap smear? NA - based on age or sex     Reviewed record in preparation for visit and have necessary documentation  Pt did not bring medication to office visit for review  Patient is accompanied by self I have received verbal consent from Bill Washington to discuss any/all medical information while they are present in the room.     Goals that were addressed and/or need to be completed during or after this appointment include     Health Maintenance Due   Topic Date Due    COVID-19 Vaccine (1) Never done    DTaP/Tdap/Td series (1 - Tdap) Never done    Colorectal Cancer Screening Combo  Never done    Shingrix Vaccine Age 50> (1 of 2) Never done    AAA Screening 73-69 YO Male Smoking Patients  Never done    Pneumococcal 65+ years (1 - PCV) Never done    Medicare Yearly Exam  Never done    Lipid Screen  06/07/2022

## 2022-05-31 NOTE — PROGRESS NOTES
Patient: Florinda Wise MRN: [id number]  SSN: [id number]    YOB: 1950  Age: 81 y.o. Sex: male      Chief Complaint   Patient presents with    Insect Bite     sting on right shoulder blade, occasional itch and pain x4-5 days      Florinda Wise is a 81 y.o. male who sustained an insect bite of back 5 days. Patient has been scratching this area. Unable to visualize site due to location. Does not know if he scratched off a tick. Patient sans other complaints or concerns at this time.     Patient Active Problem List   Diagnosis Code    Hyperlipidemia E78.5    Acquired hypothyroidism E03.9    Chronic right shoulder pain M25.511, G89.29    Astigmatism H52.209    Former smoker Z87.891    Digital mucous cyst M67.449    Chronic pain of both knees M25.561, M25.562, G89.29    Hx of basal cell carcinoma Z85.828    Erectile dysfunction N52.9     Past Surgical History:   Procedure Laterality Date    HX HEENT  1998    subcutaneous cyst removed from head    HX HEENT  1999    skin cancer removed from face    HX TONSIL AND ADENOIDECTOMY       Social History     Socioeconomic History    Marital status:      Spouse name: Not on file    Number of children: Not on file    Years of education: Not on file    Highest education level: Not on file   Occupational History    Not on file   Tobacco Use    Smoking status: Former Smoker     Packs/day: 1.00     Years: 6.00     Pack years: 6.00     Types: Cigarettes    Smokeless tobacco: Never Used   Substance and Sexual Activity    Alcohol use: Not Currently    Drug use: Not on file    Sexual activity: Yes     Partners: Female   Other Topics Concern    Not on file   Social History Narrative    Not on file     Social Determinants of Health     Financial Resource Strain:     Difficulty of Paying Living Expenses: Not on file   Food Insecurity:     Worried About Running Out of Food in the Last Year: Not on file    Jese of Food in the Last Year: Not on file Transportation Needs:     Lack of Transportation (Medical): Not on file    Lack of Transportation (Non-Medical): Not on file   Physical Activity:     Days of Exercise per Week: Not on file    Minutes of Exercise per Session: Not on file   Stress:     Feeling of Stress : Not on file   Social Connections:     Frequency of Communication with Friends and Family: Not on file    Frequency of Social Gatherings with Friends and Family: Not on file    Attends Muslim Services: Not on file    Active Member of [address] [place] [address] [employer] or Organizations: Not on file    Attends Club or Organization Meetings: Not on file    Marital Status: Not on file   Intimate Partner Violence:     Fear of Current or Ex-Partner: Not on file    Emotionally Abused: Not on file    Physically Abused: Not on file    Sexually Abused: Not on file   Housing Stability:     Unable to Pay for Housing in the Last Year: Not on file    Number of [place] in the Last Year: Not on file    Unstable Housing in the Last Year: Not on file     Family History   Problem Relation Age of Onset    Alcohol abuse Mother     Dementia Mother     Suicide Father     Other Brother         [id number] protein disorder with elevated inflamatory markers     Current Outpatient Medications   Medication Sig    OTHER Z stack vitamins    doxycycline (MONODOX) 100 mg capsule Take 1 Capsule by mouth two (2) times a day.  pravastatin (PRAVACHOL) 40 mg tablet Take 1 Tablet by mouth nightly.  ezetimibe (ZETIA) 10 mg tablet Take 1 Tablet by mouth daily.  diclofenac EC (VOLTAREN) 50 mg EC tablet Take 1 Tablet by mouth two (2) times daily as needed for Pain.  thyroid, Pork, ([place] Thyroid) 90 mg tablet Take 1 Tablet by mouth daily.  OTHER Hammer hemp 0.0%THC     No current facility-administered medications for this visit.      Allergies   Allergen Reactions    Atorvastatin Myalgia    Crestor [Rosuvastatin] Myalgia    Zetia [Ezetimibe] Myalgia     \"muscle cramps\" Review of Systems  Constitutional: feeling well, negative for fever, chills  Skin: see HPI  Respiratory: negative for cough, wheezing or SOB  Cardiovascular: negative for chest pain, dizziness or palpitations  Musculoskeletal: negative for myalgias or arthralgias   Neurological: negative for numbness, weakness or paresthesia      Vitals:    05/26/22 1600 05/26/22 1628   BP: (!) 143/78 124/67   Pulse: 64    Resp: 16    Temp: 98 °F (36.7 °C)    SpO2: 100%    Weight: 174 lb 12.8 oz (79.3 kg)    Height: 5' 11\" (1.803 m)        Physical Examination:  General: Well developed, well nourished, in no acute distress  Skin: erythematous macule right upper back  Head: Normocephalic, atraumatic  Eyes: Sclera clear, EOMI  Neck: Normal range of motion  Respiratory: unlabored effort  Cardiovascular: Regular rate and rhythm  Extremities: Full range of motion  Neurology: Active, alert and oriented, No focal deficits  Psych: Affect appropriate     Diagnoses and all orders for this visit:    1. Insect bite of back, initial encounter  -     doxycycline (MONODOX) 100 mg capsule; Take 1 Capsule by mouth two (2) times a day. Plan of care:  Diagnoses were discussed in detail with patient. Medications reviewed and appropriate. Patient to continue current prescribed medications as written. Medication risks/benefits/side effects discussed with patient. All of the patient's questions were addressed and answered to apparent satisfaction. The patient understands and agrees with our plan of care. The patient knows to call back if they have questions about the plan of care or if symptoms change. The patient received an After-Visit Summary which contains VS, diagnoses, orders, allergy and medication lists. No future appointments.

## 2022-06-19 DIAGNOSIS — E03.9 ACQUIRED HYPOTHYROIDISM: ICD-10-CM

## 2022-06-20 DIAGNOSIS — E03.9 ACQUIRED HYPOTHYROIDISM: ICD-10-CM

## 2022-06-20 NOTE — TELEPHONE ENCOUNTER
Pt called because his prescription was not ready. I let him know of the 48 hour protocol. He wanted to know if Dr. Rob Vitale could fill it now because of the 90 minute drive. I let him know that it was not possible to interrupt Dr. Rob Vitale because he was with other Patients. Pt said Pharmacy told him that his medication would be ready at 11:00 a today. I let him know that we just received his request yesterday and we were closed yesterday which means we received his request this morning. I spoke with the Pharmacy and they said no refills so they had to send it to us as I explained to the Pt.

## 2022-06-21 RX ORDER — LEVOTHYROXINE, LIOTHYRONINE 57; 13.5 UG/1; UG/1
TABLET ORAL
Qty: 90 TABLET | Refills: 1 | Status: SHIPPED | OUTPATIENT
Start: 2022-06-21

## 2022-06-21 RX ORDER — LEVOTHYROXINE AND LIOTHYRONINE 57; 13.5 UG/1; UG/1
90 TABLET ORAL DAILY
Qty: 90 TABLET | Refills: 1 | OUTPATIENT
Start: 2022-06-21

## 2022-06-30 ENCOUNTER — HOSPITAL ENCOUNTER (OUTPATIENT)
Dept: NON INVASIVE DIAGNOSTICS | Age: 72
Discharge: HOME | End: 2022-06-30
Attending: STUDENT IN AN ORGANIZED HEALTH CARE EDUCATION/TRAINING PROGRAM
Payer: COMMERCIAL

## 2022-06-30 DIAGNOSIS — Z92.89: ICD-10-CM

## 2022-06-30 DIAGNOSIS — I25.10 CORONARY ARTERY DISEASE DUE TO LIPID RICH PLAQUE: ICD-10-CM

## 2022-06-30 DIAGNOSIS — I25.83 CORONARY ARTERY DISEASE DUE TO LIPID RICH PLAQUE: ICD-10-CM

## 2022-06-30 LAB
STRESS ANGINA INDEX: 0
STRESS BASELINE DIAS BP: 73 MMHG
STRESS BASELINE HR: 52 BPM
STRESS BASELINE ST DEPRESSION: 0 MM
STRESS BASELINE SYS BP: 131 MMHG
STRESS ESTIMATED WORKLOAD: 7 METS
STRESS EXERCISE DUR MIN: 5 MIN
STRESS EXERCISE DUR SEC: 55 SEC
STRESS O2 SAT PEAK: 98 %
STRESS O2 SAT REST: 100 %
STRESS PEAK DIAS BP: 81 MMHG
STRESS PEAK SYS BP: 151 MMHG
STRESS PERCENT HR ACHIEVED: 89 %
STRESS POST PEAK HR: 134 BPM
STRESS RATE PRESSURE PRODUCT: NORMAL BPM*MMHG
STRESS STAGE 1 DURATION: 3 MIN:SEC
STRESS STAGE 1 HR: 110 BPM
STRESS STAGE 2 DURATION: NORMAL MIN:SEC
STRESS STAGE 2 HR: 134 BPM
STRESS STAGE RECOVERY 1 DURATION: NORMAL MIN:SEC
STRESS STAGE RECOVERY 1 HR: 75 BPM
STRESS TARGET HR: 150 BPM

## 2022-06-30 PROCEDURE — 93016 CV STRESS TEST SUPVJ ONLY: CPT | Performed by: INTERNAL MEDICINE

## 2022-06-30 PROCEDURE — 93017 CV STRESS TEST TRACING ONLY: CPT

## 2022-06-30 PROCEDURE — 93018 CV STRESS TEST I&R ONLY: CPT | Performed by: INTERNAL MEDICINE

## 2022-06-30 NOTE — PROGRESS NOTES
Pt tolerated stress test without difficulty. Vitals returned to baseline, IV and tele removed. Pt d/c'd to home.

## 2022-07-02 DIAGNOSIS — I25.83 CORONARY ARTERY DISEASE DUE TO LIPID RICH PLAQUE: ICD-10-CM

## 2022-07-02 DIAGNOSIS — I25.10 CORONARY ARTERY DISEASE DUE TO LIPID RICH PLAQUE: ICD-10-CM

## 2022-07-02 DIAGNOSIS — E78.2 MIXED HYPERLIPIDEMIA: ICD-10-CM

## 2022-07-03 DIAGNOSIS — E78.5 HYPERLIPIDEMIA, UNSPECIFIED HYPERLIPIDEMIA TYPE: Primary | ICD-10-CM

## 2022-07-03 RX ORDER — PRAVASTATIN SODIUM 40 MG/1
TABLET ORAL
Qty: 90 TABLET | Refills: 0 | Status: SHIPPED | OUTPATIENT
Start: 2022-07-03

## 2022-07-03 RX ORDER — EZETIMIBE 10 MG/1
TABLET ORAL
Qty: 90 TABLET | Refills: 0 | Status: SHIPPED | OUTPATIENT
Start: 2022-07-03

## 2022-07-05 NOTE — PROGRESS NOTES
Reviewed results of Exercise Cardiac Stress Test:  - Resting ECG NSR.  - Stress ECG positive for ischemia: 1.5 mm of very slightly upsloping ST depression in inferior leads + V4-6 at peak exercise with slow resolution during recovery. - Stress Test: Blood pressure and heart rate demonstrated normal response to stress.  HR recovery normal. No symptoms during the stress test.

## 2022-07-11 DIAGNOSIS — R94.39 ABNORMAL STRESS ECG: Primary | ICD-10-CM

## 2022-07-29 LAB
CHOLEST SERPL-MCNC: 106 MG/DL
HDLC SERPL-MCNC: 51 MG/DL
HDLC SERPL: 2.1 {RATIO} (ref 0–5)
LDLC SERPL CALC-MCNC: 44 MG/DL (ref 0–100)
TRIGL SERPL-MCNC: 55 MG/DL (ref ?–150)
VLDLC SERPL CALC-MCNC: 11 MG/DL

## 2022-08-01 NOTE — PROGRESS NOTES
Lipid panel improved compared to one year ago.   - LDL 44 (previously 131.4)  - Continue pravastatin and ezetimibe

## 2022-10-05 ENCOUNTER — OFFICE VISIT (OUTPATIENT)
Dept: CARDIOLOGY CLINIC | Age: 72
End: 2022-10-05
Payer: COMMERCIAL

## 2022-10-05 VITALS
HEART RATE: 68 BPM | WEIGHT: 162 LBS | BODY MASS INDEX: 22.68 KG/M2 | OXYGEN SATURATION: 98 % | HEIGHT: 71 IN | DIASTOLIC BLOOD PRESSURE: 70 MMHG | SYSTOLIC BLOOD PRESSURE: 110 MMHG

## 2022-10-05 DIAGNOSIS — E78.5 DYSLIPIDEMIA: ICD-10-CM

## 2022-10-05 DIAGNOSIS — R94.39 ABNORMAL STRESS TEST: Primary | ICD-10-CM

## 2022-10-05 DIAGNOSIS — I70.90 ATHEROSCLEROSIS: ICD-10-CM

## 2022-10-05 PROBLEM — E78.00 HYPERCHOLESTEROLEMIA: Status: ACTIVE | Noted: 2022-10-05

## 2022-10-05 PROBLEM — R93.1 ELEVATED CORONARY ARTERY CALCIUM SCORE: Status: ACTIVE | Noted: 2022-10-05

## 2022-10-05 PROCEDURE — G8420 CALC BMI NORM PARAMETERS: HCPCS | Performed by: SPECIALIST

## 2022-10-05 PROCEDURE — 3017F COLORECTAL CA SCREEN DOC REV: CPT | Performed by: SPECIALIST

## 2022-10-05 PROCEDURE — G8536 NO DOC ELDER MAL SCRN: HCPCS | Performed by: SPECIALIST

## 2022-10-05 PROCEDURE — 1123F ACP DISCUSS/DSCN MKR DOCD: CPT | Performed by: SPECIALIST

## 2022-10-05 PROCEDURE — 99204 OFFICE O/P NEW MOD 45 MIN: CPT | Performed by: SPECIALIST

## 2022-10-05 PROCEDURE — G8432 DEP SCR NOT DOC, RNG: HCPCS | Performed by: SPECIALIST

## 2022-10-05 PROCEDURE — 1101F PT FALLS ASSESS-DOCD LE1/YR: CPT | Performed by: SPECIALIST

## 2022-10-05 PROCEDURE — G8427 DOCREV CUR MEDS BY ELIG CLIN: HCPCS | Performed by: SPECIALIST

## 2022-10-05 PROCEDURE — 93000 ELECTROCARDIOGRAM COMPLETE: CPT | Performed by: SPECIALIST

## 2022-10-05 RX ORDER — GUAIFENESIN 100 MG/5ML
81 LIQUID (ML) ORAL DAILY
Qty: 30 TABLET | Refills: 5
Start: 2022-10-05

## 2022-10-05 NOTE — PROGRESS NOTES
Marbin Hernandez MD. [facility]          Patient: Vincent Shone  : 1950      Today's Date: 10/5/2022        HISTORY OF PRESENT ILLNESS:     History of Present Illness:  Referred for high CAC score and borderline stress EKG changes     He denies CP. He is active horse back riding. Class 1-2 CONNER. He feels well. He generally denies complaints. PAST MEDICAL HISTORY:     Past Medical History:   Diagnosis Date    Atherosclerosis     Depression     Dyslipidemia     Elevated coronary artery calcium score     Hypercholesterolemia     Skin cancer     Thyroid disease        Past Surgical History:   Procedure Laterality Date    HX HEENT      subcutaneous cyst removed from head    HX HEENT      skin cancer removed from face    HX TONSIL AND ADENOIDECTOMY               CURRENT MEDICATIONS:    .  Current Outpatient Medications   Medication Sig Dispense Refill    aspirin 81 mg chewable tablet Take 1 Tablet by mouth daily. 30 Tablet 5    ezetimibe (ZETIA) 10 mg tablet TAKE ONE TABLET BY MOUTH ONCE A DAY 90 Tablet 0    pravastatin (PRAVACHOL) 40 mg tablet TAKE ONE TABLET BY MOUTH NIGHTLY (Patient taking differently: 3 days a week.) 90 Tablet 0    NP Thyroid 90 mg tablet TAKE 1 TABLET BY MOUTH EVERY DAY 90 Tablet 1    OTHER Z stack vitamins      diclofenac EC (VOLTAREN) 50 mg EC tablet Take 1 Tablet by mouth two (2) times daily as needed for Pain. 60 Tablet 2    OTHER Hammer hemp 0.0%THC      doxycycline (MONODOX) 100 mg capsule Take 1 Capsule by mouth two (2) times a day.  (Patient not taking: Reported on 10/5/2022) 20 Capsule 0       Allergies   Allergen Reactions    Atorvastatin Myalgia    Crestor [Rosuvastatin] Myalgia    Zetia [Ezetimibe] Myalgia     \"muscle cramps\"         SOCIAL HISTORY:     Social History     Tobacco Use    Smoking status: Former     Packs/day: 1.00     Years: 6.00     Pack years: 6.00     Types: Cigarettes    Smokeless tobacco: Never   Substance Use Topics    Alcohol use: Not Currently         FAMILY HISTORY:     Family History   Problem Relation Age of Onset    Alcohol abuse Mother     Dementia Mother     Suicide Father     Other Brother         rnp1 protein disorder with elevated inflamatory markers    Heart Disease Paternal Grandfather          REVIEW OF SYMPTOMS:     Review of Symptoms:  Constitutional: Negative for fever, chills  HEENT: Negative for nosebleeds, tinnitus, and vision changes. Respiratory: Negative for cough, wheezing  Cardiovascular: Negative for orthopnea, claudication, syncope, and PND. Gastrointestinal: Negative for abdominal pain, diarrhea, melena. Genitourinary: Negative for dysuria  Musculoskeletal: Negative for myalgias. Skin: Negative for rash  Heme: No problems bleeding. Neurological: Negative for speech change and focal weakness. PHYSICAL EXAM:     Physical Exam:  Visit Vitals  /70 (BP 1 Location: Left upper arm, BP Patient Position: Sitting)   Pulse 68   Ht 5' 11\" (1.803 m)   Wt 162 lb (73.5 kg)   SpO2 98%   BMI 22.59 kg/m²       Patient appears generally well, mood and affect are appropriate and pleasant. HEENT:  Hearing intact, non-icteric, normocephalic, atraumatic. Neck Exam: Supple, No JVD    Lung Exam: Clear to auscultation, even breath sounds. Cardiac Exam: Regular rate and rhythm with no murmur or rub  Abdomen: Soft, non-tender  Extremities: Moves all ext well. No lower extremity edema.   MSKTL: Overall good ROM ext  Skin: No significant rashes  Psych: Appropriate affect  Neuro - Grossly intact        LABS / OTHER STUDIES:     Lab Results   Component Value Date/Time    Sodium 140 06/07/2021 08:35 AM    Potassium 5.2 (H) 06/07/2021 08:35 AM    Chloride 108 06/07/2021 08:35 AM    CO2 26 06/07/2021 08:35 AM    Anion gap 6 06/07/2021 08:35 AM    Glucose 92 06/07/2021 08:35 AM    BUN 16 06/07/2021 08:35 AM    Creatinine 0.87 06/07/2021 08:35 AM    BUN/Creatinine ratio 18 06/07/2021 08:35 AM    GFR est AA >60 06/07/2021 08:35 AM GFR est non-AA >60 06/07/2021 08:35 AM    Calcium 9.4 06/07/2021 08:35 AM    Bilirubin, total 0.6 06/07/2021 08:35 AM    Alk. phosphatase 48 06/07/2021 08:35 AM    Protein, total 6.9 06/07/2021 08:35 AM    Albumin 4.1 06/07/2021 08:35 AM    Globulin 2.8 06/07/2021 08:35 AM    A-G Ratio 1.5 06/07/2021 08:35 AM    ALT (SGPT) 38 06/07/2021 08:35 AM    AST (SGOT) 31 06/07/2021 08:35 AM       Lab Results   Component Value Date/Time    Cholesterol, total 106 07/28/2022 09:55 AM    HDL Cholesterol 51 07/28/2022 09:55 AM    LDL, calculated 44 07/28/2022 09:55 AM    VLDL, calculated 11 07/28/2022 09:55 AM    Triglyceride 55 07/28/2022 09:55 AM    CHOL/HDL Ratio 2.1 07/28/2022 09:55 AM       Lab Results   Component Value Date/Time    TSH 2.74 02/22/2022 10:30 AM           CARDIAC DIAGNOSTICS:     Cardiac Evaluation Includes:  I reviewed the test results below. CT Heart scan 3/25/22 - CAC score 1060; at 83 percentile rank    Treadmill stress test 6/30/22 - walked 5:55 (7 METS), No CP. Stress ECG was positive for ischemia. There was 1.5 mm of very slightly upsloping ST depression in inferior leads + V4-6 at peak exercise with slow resolution during recovery ---> I reviewed EKG strips - there were at most just borderline EKG changes           ASSESSMENT AND PLAN:     Assessment and Plan:      1) Severe Atherosclerosis   - CT Heart scan 3/25/22 - CAC score 1060; at 83 percentile rank  - Treadmill stress test 6/30/22 - walked 5:55 (7 METS), No CP. Stress ECG was positive for ischemia.  There was 1.5 mm of very slightly upsloping ST depression in inferior leads + V4-6 at peak exercise with slow resolution during recovery ---> I reviewed EKG strips - there were at most just borderline EKG changes   - He denies CP and remains active   - Reviewed a healthy diet for prevention - which he has been following   - check a stress echo to workup for obstructive disease   - cont lipid control (statin) and ASA     2) Dyslipidemia - intolerant to lipitor and crestor   - taking just low dose of pravastatin every other day plus Zetia  - his recent lipids look excellent on current regimen (LDL < 55)   ---> will have him take pravastatin daily and zetia and reassess labs in 4 months --> consider repetha if he cannot tolerate daily statins (try CoQ10 for cramps)     3) See me in 4 months     Works for [employer]). [place]  with grown kids. Ze Mckee MD, [address], [address]  [address], [address], [address] [place] [address]  Ph: [phone]   Ph [phone]      ADDENDUM   11/7/2022    Stress Echo 11/7/22 - Baseline LVEF 55-60%. Walked 10 min (13 METS). No CP. Stress ECG: Ischemic EKG changes with 2 mm slow upsloping ST depression at peak exercise in the inferolateral leads. Post-stress Echo: The post-stress echo showed worsened wall motion abnormalities compared to baseline which are indicative of myocardial ischemia. Mild hypokinesis of the following segments: mid anteroseptal. Severe hypokinesis of the following segments: apical septal.    Study Impression: Consistent with moderate LAD territory ischemia. I reviewed findings with patient. Findings suggest LAD ischemia. He does have a good workload and class 2 CONNER. I reviewed options of medical management of presumed CAD vs cardiac cath  to further define anatomy. We decided to proceed with a cardiac cath and he agrees to proceed (risks of MI, CVA, JAZMINE, death, etc).

## 2022-10-05 NOTE — PATIENT INSTRUCTIONS
DASH Diet: Care Instructions  Your Care Instructions     The DASH diet is an eating plan that can help lower your blood pressure. DASH stands for Dietary Approaches to Stop Hypertension. Hypertension is high blood pressure. The DASH diet focuses on eating foods that are high in calcium, potassium, and magnesium. These nutrients can lower blood pressure. The foods that are highest in these nutrients are fruits, vegetables, low-fat dairy products, nuts, seeds, and legumes. But taking calcium, potassium, and magnesium supplements instead of eating foods that are high in those nutrients does not have the same effect. The DASH diet also includes whole grains, fish, and poultry. The DASH diet is one of several lifestyle changes your doctor may recommend to lower your high blood pressure. Your doctor may also want you to decrease the amount of sodium in your diet. Lowering sodium while following the DASH diet can lower blood pressure even further than just the DASH diet alone. Follow-up care is a key part of your treatment and safety. Be sure to make and go to all appointments, and call your doctor if you are having problems. It's also a good idea to know your test results and keep a list of the medicines you take. How can you care for yourself at home? Following the DASH diet  Eat 4 to 5 servings of fruit each day. A serving is 1 medium-sized piece of fruit, ½ cup chopped or canned fruit, 1/4 cup dried fruit, or 4 ounces (½ cup) of fruit juice. Choose fruit more often than fruit juice. Eat 4 to 5 servings of vegetables each day. A serving is 1 cup of lettuce or raw leafy vegetables, ½ cup of chopped or cooked vegetables, or 4 ounces (½ cup) of vegetable juice. Choose vegetables more often than vegetable juice. Get 2 to 3 servings of low-fat and fat-free dairy each day. A serving is 8 ounces of milk, 1 cup of yogurt, or 1 ½ ounces of cheese. Eat 6 to 8 servings of grains each day.  A serving is 1 slice of bread, 1 ounce of dry cereal, or ½ cup of cooked rice, pasta, or cooked cereal. Try to choose whole-grain products as much as possible. Limit lean meat, poultry, and fish to 2 servings each day. A serving is 3 ounces, about the size of a deck of cards. Eat 4 to 5 servings of nuts, seeds, and legumes (cooked dried beans, lentils, and split peas) each week. A serving is 1/3 cup of nuts, 2 tablespoons of seeds, or ½ cup of cooked beans or peas. Limit fats and oils to 2 to 3 servings each day. A serving is 1 teaspoon of vegetable oil or 2 tablespoons of salad dressing. Limit sweets and added sugars to 5 servings or less a week. A serving is 1 tablespoon jelly or jam, ½ cup sorbet, or 1 cup of lemonade. Eat less than 2,300 milligrams (mg) of sodium a day. If you limit your sodium to 1,500 mg a day, you can lower your blood pressure even more. Be aware that all of these are the suggested number of servings for people who eat 1,800 to 2,000 calories a day. Your recommended number of servings may be different if you need more or fewer calories. Tips for success  Start small. Do not try to make dramatic changes to your diet all at once. You might feel that you are missing out on your favorite foods and then be more likely to not follow the plan. Make small changes, and stick with them. Once those changes become habit, add a few more changes. Try some of the following:  Make it a goal to eat a fruit or vegetable at every meal and at snacks. This will make it easy to get the recommended amount of fruits and vegetables each day. Try yogurt topped with fruit and nuts for a snack or healthy dessert. Add lettuce, tomato, cucumber, and onion to sandwiches. Combine a ready-made pizza crust with low-fat mozzarella cheese and lots of vegetable toppings. Try using tomatoes, squash, spinach, broccoli, carrots, cauliflower, and onions.   Have a variety of cut-up vegetables with a low-fat dip as an appetizer instead of chips and dip.  Sprinkle sunflower seeds or chopped almonds over salads. Or try adding chopped walnuts or almonds to cooked vegetables. Try some vegetarian meals using beans and peas. Add garbanzo or kidney beans to salads. Make burritos and tacos with mashed cooley beans or black beans. Where can you learn more? Go to http://www.omer.com/  Enter H967 in the search box to learn more about \"DASH Diet: Care Instructions. \"  Current as of: January 10, 2022               Content Version: 13.2  © 5907-8681 [employer]. Care instructions adapted under license by [employer] (which disclaims liability or warranty for this information). If you have questions about a medical condition or this instruction, always ask your healthcare professional. [address] any warranty or liability for your use of this information.

## 2022-10-05 NOTE — PROGRESS NOTES
Chief Complaint   Patient presents with    New Patient     Ref by Dr. Drusilla Ormond    Cholesterol Problem     Visit Vitals  /70 (BP 1 Location: Left upper arm, BP Patient Position: Sitting)   Pulse 68   Ht 5' 11\" (1.803 m)   Wt 162 lb (73.5 kg)   SpO2 98%   BMI 22.59 kg/m²     Chest pain denied   SOB denied   Palpitations denied   Swelling in hands/feet denied   Dizziness - sometimes if stand up too fast and exertion cause lightheadedness.    Recent hospital stays denied   Refills denied

## 2022-11-07 ENCOUNTER — ANCILLARY PROCEDURE (OUTPATIENT)
Dept: CARDIOLOGY CLINIC | Age: 72
End: 2022-11-07
Payer: COMMERCIAL

## 2022-11-07 ENCOUNTER — APPOINTMENT (OUTPATIENT)
Dept: CARDIOLOGY CLINIC | Age: 72
End: 2022-11-07

## 2022-11-07 VITALS — HEIGHT: 71 IN | WEIGHT: 162 LBS | BODY MASS INDEX: 22.68 KG/M2

## 2022-11-07 DIAGNOSIS — I70.90 ATHEROSCLEROSIS: ICD-10-CM

## 2022-11-07 DIAGNOSIS — R94.39 ABNORMAL STRESS TEST: ICD-10-CM

## 2022-11-07 LAB
STRESS ANGINA INDEX: 0
STRESS BASELINE DIAS BP: 72 MMHG
STRESS BASELINE HR: 54 BPM
STRESS BASELINE SYS BP: 116 MMHG
STRESS ESTIMATED WORKLOAD: 13.7 METS
STRESS EXERCISE DUR MIN: 10 MIN
STRESS EXERCISE DUR SEC: 0 SEC
STRESS O2 SAT PEAK: 100 %
STRESS O2 SAT REST: 99 %
STRESS PEAK DIAS BP: 86 MMHG
STRESS PEAK SYS BP: 180 MMHG
STRESS PERCENT HR ACHIEVED: 103 %
STRESS POST PEAK HR: 155 BPM
STRESS RATE PRESSURE PRODUCT: NORMAL BPM*MMHG
STRESS TARGET HR: 150 BPM

## 2022-11-07 PROCEDURE — 93351 STRESS TTE COMPLETE: CPT | Performed by: SPECIALIST

## 2022-11-11 ENCOUNTER — TELEPHONE (OUTPATIENT)
Dept: CARDIOLOGY CLINIC | Age: 72
End: 2022-11-11

## 2022-11-11 DIAGNOSIS — R94.39 ABNORMAL STRESS TEST: ICD-10-CM

## 2022-11-11 DIAGNOSIS — I70.90 ATHEROSCLEROSIS: ICD-10-CM

## 2022-11-11 DIAGNOSIS — I25.83 CORONARY ARTERY DISEASE DUE TO LIPID RICH PLAQUE: Primary | ICD-10-CM

## 2022-11-11 DIAGNOSIS — I25.10 CORONARY ARTERY DISEASE DUE TO LIPID RICH PLAQUE: Primary | ICD-10-CM

## 2022-11-11 DIAGNOSIS — E78.5 DYSLIPIDEMIA: ICD-10-CM

## 2022-11-11 NOTE — TELEPHONE ENCOUNTER
Per Dr. Goddard Puls: [facility] - can you please set up patient for a cardiac cath for abnormal stress echo and CONNER. Cath with Dr. Andrew Crabtree. He'll need precath labs. \"

## 2022-11-17 ENCOUNTER — LAB ONLY (OUTPATIENT)
Dept: FAMILY MEDICINE CLINIC | Age: 72
End: 2022-11-17

## 2022-11-17 DIAGNOSIS — I70.90 ATHEROSCLEROSIS: ICD-10-CM

## 2022-11-17 DIAGNOSIS — I25.10 CORONARY ARTERY DISEASE DUE TO LIPID RICH PLAQUE: ICD-10-CM

## 2022-11-17 DIAGNOSIS — E78.5 DYSLIPIDEMIA: ICD-10-CM

## 2022-11-17 DIAGNOSIS — I25.83 CORONARY ARTERY DISEASE DUE TO LIPID RICH PLAQUE: ICD-10-CM

## 2022-11-17 DIAGNOSIS — R94.39 ABNORMAL STRESS TEST: ICD-10-CM

## 2022-11-18 LAB
ALBUMIN SERPL-MCNC: 4.1 G/DL (ref 3.5–5)
ALBUMIN/GLOB SERPL: 1.8 (ref 1.1–2.2)
ALP SERPL-CCNC: 55 U/L (ref 45–117)
ALT SERPL-CCNC: 71 U/L (ref 12–78)
ANION GAP SERPL CALC-SCNC: 3 MMOL/L (ref 5–15)
AST SERPL-CCNC: 30 U/L (ref 15–37)
BILIRUB SERPL-MCNC: 0.8 MG/DL (ref 0.2–1)
BUN SERPL-MCNC: 17 MG/DL (ref 6–20)
BUN/CREAT SERPL: 20 (ref 12–20)
CALCIUM SERPL-MCNC: 8.9 MG/DL (ref 8.5–10.1)
CHLORIDE SERPL-SCNC: 110 MMOL/L (ref 97–108)
CO2 SERPL-SCNC: 30 MMOL/L (ref 21–32)
CREAT SERPL-MCNC: 0.85 MG/DL (ref 0.7–1.3)
ERYTHROCYTE [DISTWIDTH] IN BLOOD BY AUTOMATED COUNT: 12.4 % (ref 11.5–14.5)
GLOBULIN SER CALC-MCNC: 2.3 G/DL (ref 2–4)
GLUCOSE SERPL-MCNC: 120 MG/DL (ref 65–100)
HCT VFR BLD AUTO: 39.6 % (ref 36.6–50.3)
HGB BLD-MCNC: 12.6 G/DL (ref 12.1–17)
MCH RBC QN AUTO: 31.6 PG (ref 26–34)
MCHC RBC AUTO-ENTMCNC: 31.8 G/DL (ref 30–36.5)
MCV RBC AUTO: 99.2 FL (ref 80–99)
NRBC # BLD: 0 K/UL (ref 0–0.01)
NRBC BLD-RTO: 0 PER 100 WBC
PLATELET # BLD AUTO: 160 K/UL (ref 150–400)
PMV BLD AUTO: 12.8 FL (ref 8.9–12.9)
POTASSIUM SERPL-SCNC: 4.7 MMOL/L (ref 3.5–5.1)
PROT SERPL-MCNC: 6.4 G/DL (ref 6.4–8.2)
RBC # BLD AUTO: 3.99 M/UL (ref 4.1–5.7)
SODIUM SERPL-SCNC: 143 MMOL/L (ref 136–145)
WBC # BLD AUTO: 5.9 K/UL (ref 4.1–11.1)

## 2022-11-19 LAB
CHOLEST SERPL-MCNC: 115 MG/DL (ref 100–199)
HDL SERPL-SCNC: 29.9 UMOL/L
HDLC SERPL-MCNC: 39 MG/DL
LDL SERPL QN: 20.5 NM
LDL SERPL-SCNC: 627 NMOL/L
LDL SMALL SERPL-SCNC: 311 NMOL/L
LDLC SERPL CALC-MCNC: 64 MG/DL (ref 0–99)
LP-IR SCORE SERPL: 40
TRIGL SERPL-MCNC: 55 MG/DL (ref 0–149)

## 2022-11-20 LAB — LPA SERPL-SCNC: 54.3 NMOL/L

## 2022-11-20 NOTE — PROGRESS NOTES
Dear Mr. Way,  Your test results are stable. Please let me know if you have any questions.    Dr. Ashlee Yan

## 2022-11-22 NOTE — PROGRESS NOTES
Dear Mr. Brenda Hung,  Your lab results are stable. Cholesterol looks OK. Please let me know if you have any questions.    Colt Valdes,  Dr. Valentín Ordoñez

## 2022-12-02 DIAGNOSIS — R94.39 ABNORMAL STRESS TEST: Primary | ICD-10-CM

## 2022-12-02 RX ORDER — SODIUM CHLORIDE 0.9 % (FLUSH) 0.9 %
5-40 SYRINGE (ML) INJECTION EVERY 8 HOURS
Status: CANCELLED | OUTPATIENT
Start: 2022-12-05

## 2022-12-02 RX ORDER — SODIUM CHLORIDE 0.9 % (FLUSH) 0.9 %
5-40 SYRINGE (ML) INJECTION AS NEEDED
Status: CANCELLED | OUTPATIENT
Start: 2022-12-05

## 2022-12-02 RX ORDER — SODIUM CHLORIDE 9 MG/ML
100 INJECTION, SOLUTION INTRAVENOUS CONTINUOUS
Status: CANCELLED | OUTPATIENT
Start: 2022-12-05

## 2022-12-05 ENCOUNTER — HOSPITAL ENCOUNTER (OUTPATIENT)
Age: 72
Setting detail: OUTPATIENT SURGERY
Discharge: HOME | End: 2022-12-05
Attending: STUDENT IN AN ORGANIZED HEALTH CARE EDUCATION/TRAINING PROGRAM | Admitting: STUDENT IN AN ORGANIZED HEALTH CARE EDUCATION/TRAINING PROGRAM
Payer: COMMERCIAL

## 2022-12-05 VITALS
DIASTOLIC BLOOD PRESSURE: 57 MMHG | OXYGEN SATURATION: 100 % | SYSTOLIC BLOOD PRESSURE: 123 MMHG | HEART RATE: 61 BPM | RESPIRATION RATE: 17 BRPM | TEMPERATURE: 97.7 F | BODY MASS INDEX: 22.61 KG/M2 | WEIGHT: 161.49 LBS | HEIGHT: 71 IN

## 2022-12-05 DIAGNOSIS — R94.39 ABNORMAL STRESS TEST: ICD-10-CM

## 2022-12-05 PROCEDURE — 77030008543 HC TBNG MON PRSS MRTM -A: Performed by: STUDENT IN AN ORGANIZED HEALTH CARE EDUCATION/TRAINING PROGRAM

## 2022-12-05 PROCEDURE — 74011000636 HC RX REV CODE- 636: Performed by: STUDENT IN AN ORGANIZED HEALTH CARE EDUCATION/TRAINING PROGRAM

## 2022-12-05 PROCEDURE — 99153 MOD SED SAME PHYS/QHP EA: CPT | Performed by: STUDENT IN AN ORGANIZED HEALTH CARE EDUCATION/TRAINING PROGRAM

## 2022-12-05 PROCEDURE — C1887 CATHETER, GUIDING: HCPCS | Performed by: STUDENT IN AN ORGANIZED HEALTH CARE EDUCATION/TRAINING PROGRAM

## 2022-12-05 PROCEDURE — 74011250636 HC RX REV CODE- 250/636: Performed by: STUDENT IN AN ORGANIZED HEALTH CARE EDUCATION/TRAINING PROGRAM

## 2022-12-05 PROCEDURE — 93458 L HRT ARTERY/VENTRICLE ANGIO: CPT | Performed by: STUDENT IN AN ORGANIZED HEALTH CARE EDUCATION/TRAINING PROGRAM

## 2022-12-05 PROCEDURE — 74011000250 HC RX REV CODE- 250: Performed by: STUDENT IN AN ORGANIZED HEALTH CARE EDUCATION/TRAINING PROGRAM

## 2022-12-05 PROCEDURE — 77030019569 HC BND COMPR RAD TERU -B: Performed by: STUDENT IN AN ORGANIZED HEALTH CARE EDUCATION/TRAINING PROGRAM

## 2022-12-05 PROCEDURE — 77030015766: Performed by: STUDENT IN AN ORGANIZED HEALTH CARE EDUCATION/TRAINING PROGRAM

## 2022-12-05 PROCEDURE — C1769 GUIDE WIRE: HCPCS | Performed by: STUDENT IN AN ORGANIZED HEALTH CARE EDUCATION/TRAINING PROGRAM

## 2022-12-05 PROCEDURE — 2709999900 HC NON-CHARGEABLE SUPPLY: Performed by: STUDENT IN AN ORGANIZED HEALTH CARE EDUCATION/TRAINING PROGRAM

## 2022-12-05 PROCEDURE — 77030040934 HC CATH DIAG DXTERITY MEDT -A: Performed by: STUDENT IN AN ORGANIZED HEALTH CARE EDUCATION/TRAINING PROGRAM

## 2022-12-05 PROCEDURE — C1894 INTRO/SHEATH, NON-LASER: HCPCS | Performed by: STUDENT IN AN ORGANIZED HEALTH CARE EDUCATION/TRAINING PROGRAM

## 2022-12-05 PROCEDURE — 93571 IV DOP VEL&/PRESS C FLO 1ST: CPT | Performed by: STUDENT IN AN ORGANIZED HEALTH CARE EDUCATION/TRAINING PROGRAM

## 2022-12-05 PROCEDURE — 99152 MOD SED SAME PHYS/QHP 5/>YRS: CPT | Performed by: STUDENT IN AN ORGANIZED HEALTH CARE EDUCATION/TRAINING PROGRAM

## 2022-12-05 RX ORDER — HEPARIN SODIUM 200 [USP'U]/100ML
INJECTION, SOLUTION INTRAVENOUS
Status: COMPLETED | OUTPATIENT
Start: 2022-12-05 | End: 2022-12-05

## 2022-12-05 RX ORDER — SODIUM CHLORIDE 0.9 % (FLUSH) 0.9 %
5-40 SYRINGE (ML) INJECTION EVERY 8 HOURS
Status: DISCONTINUED | OUTPATIENT
Start: 2022-12-05 | End: 2022-12-05 | Stop reason: HOSPADM

## 2022-12-05 RX ORDER — VERAPAMIL HYDROCHLORIDE 2.5 MG/ML
INJECTION, SOLUTION INTRAVENOUS AS NEEDED
Status: DISCONTINUED | OUTPATIENT
Start: 2022-12-05 | End: 2022-12-05 | Stop reason: HOSPADM

## 2022-12-05 RX ORDER — SODIUM CHLORIDE 9 MG/ML
100 INJECTION, SOLUTION INTRAVENOUS CONTINUOUS
Status: DISCONTINUED | OUTPATIENT
Start: 2022-12-05 | End: 2022-12-05 | Stop reason: HOSPADM

## 2022-12-05 RX ORDER — HEPARIN SODIUM 1000 [USP'U]/ML
INJECTION, SOLUTION INTRAVENOUS; SUBCUTANEOUS AS NEEDED
Status: DISCONTINUED | OUTPATIENT
Start: 2022-12-05 | End: 2022-12-05 | Stop reason: HOSPADM

## 2022-12-05 RX ORDER — FENTANYL CITRATE 50 UG/ML
INJECTION, SOLUTION INTRAMUSCULAR; INTRAVENOUS AS NEEDED
Status: DISCONTINUED | OUTPATIENT
Start: 2022-12-05 | End: 2022-12-05 | Stop reason: HOSPADM

## 2022-12-05 RX ORDER — SODIUM CHLORIDE 0.9 % (FLUSH) 0.9 %
5-40 SYRINGE (ML) INJECTION AS NEEDED
Status: DISCONTINUED | OUTPATIENT
Start: 2022-12-05 | End: 2022-12-05 | Stop reason: HOSPADM

## 2022-12-05 RX ORDER — LIDOCAINE HYDROCHLORIDE 10 MG/ML
INJECTION INFILTRATION; PERINEURAL AS NEEDED
Status: DISCONTINUED | OUTPATIENT
Start: 2022-12-05 | End: 2022-12-05 | Stop reason: HOSPADM

## 2022-12-05 RX ORDER — MIDAZOLAM HYDROCHLORIDE 1 MG/ML
INJECTION, SOLUTION INTRAMUSCULAR; INTRAVENOUS AS NEEDED
Status: DISCONTINUED | OUTPATIENT
Start: 2022-12-05 | End: 2022-12-05 | Stop reason: HOSPADM

## 2022-12-05 RX ORDER — ACETAMINOPHEN 160 MG/5ML
400 SUSPENSION, ORAL (FINAL DOSE FORM) ORAL DAILY
COMMUNITY

## 2022-12-05 NOTE — DISCHARGE INSTRUCTIONS
[address] AND INTERVENTIONAL DISCHARGE INSTRUCTIONS       MEDICATIONS:  Take only medications ordered by your provider. ACTIVITIES:  While the wound is healing; bleeding or swelling can occur as a result of stress or strain. Carefully follow these guidelines:    DO NOT DRIVE for 24 hours after the procedure or as instructed by your provider. You may shower 24 hours after your procedure. No soaking the wrist for 7 days (i.e., bath tub, swimming, washing dishes). You may return to work in 3 days. It is essential that you DO NOT SMOKE. Do not bend your wrist for 24 hours. Do not lift more than 3-5 pounds with affected wrist for 1 week. SITE CARE:  Keep site clean and dry. Avoid lotions, ointments or powders at the wound site for 1 week. Check the procedural site for any signs of infection (redness, drainage, swelling). You may notice a small, hard lump or small bruise at the puncture site. This is normal and will clear in about 2 weeks. If the lump increases in size; apply firm, direct pressure over the site. Notify your physician. If there is a small amount of bleeding at the site; apply firm, direct continuous pressure over the site against the puncture site for 10 minutes. Your nurse will review this with you. Notify your physician. If bleeding does not stop after 10 minutes or if there is a large amount of bleeding, call for an ambulance immediately. Continue to hold pressure until help arrives. WHEN TO CALL YOUR DOCTOR:  Angina - if your angina symptoms return; use your nitroglycerin as instructed by your provider. If you do not have nitroglycerin or if your symptoms do not completely resolve after 10 -15 minutes, call an ambulance. Do not drive yourself to the hospital.  Warmth, redness, drainage and/or pain at the procedural site or temperature over 101°F.  Persistent tenderness or pain at procedural site.   Swelling, coolness, numbness and /or tingling of the fingers, hand, wrist or arm. Lightheadedness, dizziness, fainting or rapid heart beating. If you have any other questions or concerns, or you are experiencing a problem. FOLLOW-UP CARE:  Follow up with your provider and /or cardiologist as recommended. If you had a Stent implanted, carry your Stent ID card with you at all times.   Never stop taking your prescribed Brilinta (Ticagrelor),Plavix (Clopidogrel), Aspirin, Coumadin (Warfarin) or Effient (Prasugrel) without speaking with your cardiologist.

## 2022-12-05 NOTE — PROCEDURES
BRIEF PROCEDURE NOTE    Date of Procedure: 12/5/2022   Preoperative Diagnosis: abnormal stress test  Postoperative Diagnosis: CAD    Procedure: Left heart cath, coronary angiography, moderate sedation  Interventional Cardiologist: Caren Jauregui DO  Assistant: None  Anesthesia: local + IV moderate sedation   I administered moderate sedation throughout this procedure. An independent trained observer pushed medications at my direction, and monitored the patients level of consciousness and physiological status throughout. Estimated Blood Loss: Minimal    Access: right radial artery, 6F  Catheters:  Left coronary:jl 3.5, 5f  Right coronary: jr4, 5f    Findings:   L Main:  large caliber, mild ostial disease  LAD: large caliber, calcified, several serial stenosis of ~60% throughout LAD (iFR into distal bed was 0.85 with diffuse stenosis on iFR pullback ), small d1, moderate d2 with moderate disease  LCx:large caliber, small om1, modertte om2, diffuse mild to moderate calcified disease  RCA: moderate to large caliber, calcified, diffuse mild to moderate disease, moderate pda and moderate pl branch with mild disease    LVEDP:  11 mmhg    Specimens Removed: None    Implants: n/a    Closure Device: radial TR band    See full cath note. Complications: none      Findings:  1. Diffuse moderate LAD disease with iFR of 0.85 with diffuse ischemia on pull-back  2. Normal lvedp    Plan:    Recommend to continue medical management of cad, no focal lesion that would benefit from pci. Borderline ischemia within lad territory, can consider lima to lad if he demonstrates increased ischemic symptoms.       DO Amelia Castaneda DO  [facility] [address] [place], [address] [place] [address]                                              Office [phone],[facility] [phone]

## 2022-12-05 NOTE — H&P
History and physical has been reviewed. The patient has been examined. There have been no significant clinical changes since the completion of the originally dated History and Physical.    Risk and benefit of cardiac catheterization/PCI was described in detail to patient.  (risk of vascular access complication with potential surgical intervention for management, stroke, myocardial infarction, emergent open heart surgery and death). Patient wishes to proceed with coronary angiography with possible intervention. Labs   Lab Results   Component Value Date/Time    WBC 5.9 2022 09:55 AM    HGB 12.6 2022 09:55 AM    HCT 39.6 2022 09:55 AM    PLATELET 197  09:55 AM    MCV 99.2 (H) 2022 09:55 AM     Lab Results   Component Value Date/Time    Sodium 143 2022 09:55 AM    Potassium 4.7 2022 09:55 AM    Chloride 110 (H) 2022 09:55 AM    CO2 30 2022 09:55 AM    Anion gap 3 (L) 2022 09:55 AM    Glucose 120 (H) 2022 09:55 AM    BUN 17 2022 09:55 AM    Creatinine 0.85 2022 09:55 AM    BUN/Creatinine ratio 20 2022 09:55 AM    GFR est AA >60 2021 08:35 AM    GFR est non-AA >60 2021 08:35 AM    Calcium 8.9 2022 09:55 AM         ASA 3  Blanco Delcid MD. [facility]              Patient: Bina Jara  : 1950        Today's Date: 10/5/2022           HISTORY OF PRESENT ILLNESS:      History of Present Illness:  Referred for high CAC score and borderline stress EKG changes      He denies CP. He is active horse back riding. Class 1-2 CONNER. He feels well. He generally denies complaints.              PAST MEDICAL HISTORY:           Past Medical History:   Diagnosis Date    Atherosclerosis      Depression      Dyslipidemia      Elevated coronary artery calcium score      Hypercholesterolemia      Skin cancer      Thyroid disease                 Past Surgical History:   Procedure Laterality Date    HX HEENT 1998     subcutaneous cyst removed from head    HX HEENT   1999     skin cancer removed from face    HX TONSIL AND ADENOIDECTOMY                      CURRENT MEDICATIONS:    .         Current Outpatient Medications   Medication Sig Dispense Refill    aspirin 81 mg chewable tablet Take 1 Tablet by mouth daily. 30 Tablet 5    ezetimibe (ZETIA) 10 mg tablet TAKE ONE TABLET BY MOUTH ONCE A DAY 90 Tablet 0    pravastatin (PRAVACHOL) 40 mg tablet TAKE ONE TABLET BY MOUTH NIGHTLY (Patient taking differently: 3 days a week.) 90 Tablet 0    NP Thyroid 90 mg tablet TAKE 1 TABLET BY MOUTH EVERY DAY 90 Tablet 1    OTHER Z stack vitamins        diclofenac EC (VOLTAREN) 50 mg EC tablet Take 1 Tablet by mouth two (2) times daily as needed for Pain. 60 Tablet 2    OTHER Hammer hemp 0.0%THC        doxycycline (MONODOX) 100 mg capsule Take 1 Capsule by mouth two (2) times a day. (Patient not taking: Reported on 10/5/2022) 20 Capsule 0               Allergies   Allergen Reactions    Atorvastatin Myalgia    Crestor [Rosuvastatin] Myalgia    Zetia [Ezetimibe] Myalgia       \"muscle cramps\"            SOCIAL HISTORY:      Social History            Tobacco Use    Smoking status: Former       Packs/day: 1.00       Years: 6.00       Pack years: 6.00       Types: Cigarettes    Smokeless tobacco: Never   Substance Use Topics    Alcohol use: Not Currently            FAMILY HISTORY:            Family History   Problem Relation Age of Onset    Alcohol abuse Mother      Dementia Mother      Suicide Father      Other Brother           rnp1 protein disorder with elevated inflamatory markers    Heart Disease Paternal Grandfather              REVIEW OF SYMPTOMS:      Review of Symptoms:  Constitutional: Negative for fever, chills  HEENT: Negative for nosebleeds, tinnitus, and vision changes. Respiratory: Negative for cough, wheezing  Cardiovascular: Negative for orthopnea, claudication, syncope, and PND.    Gastrointestinal: Negative for abdominal pain, diarrhea, melena. Genitourinary: Negative for dysuria  Musculoskeletal: Negative for myalgias. Skin: Negative for rash  Heme: No problems bleeding. Neurological: Negative for speech change and focal weakness. PHYSICAL EXAM:      Physical Exam:  Visit Vitals  /70 (BP 1 Location: Left upper arm, BP Patient Position: Sitting)   Pulse 68   Ht 5' 11\" (1.803 m)   Wt 162 lb (73.5 kg)   SpO2 98%   BMI 22.59 kg/m²         Patient appears generally well, mood and affect are appropriate and pleasant. HEENT:  Hearing intact, non-icteric, normocephalic, atraumatic. Neck Exam: Supple, No JVD    Lung Exam: Clear to auscultation, even breath sounds. Cardiac Exam: Regular rate and rhythm with no murmur or rub  Abdomen: Soft, non-tender  Extremities: Moves all ext well. No lower extremity edema. MSKTL: Overall good ROM ext  Skin: No significant rashes  Psych: Appropriate affect  Neuro - Grossly intact           LABS / OTHER STUDIES:            Lab Results   Component Value Date/Time     Sodium 140 06/07/2021 08:35 AM     Potassium 5.2 (H) 06/07/2021 08:35 AM     Chloride 108 06/07/2021 08:35 AM     CO2 26 06/07/2021 08:35 AM     Anion gap 6 06/07/2021 08:35 AM     Glucose 92 06/07/2021 08:35 AM     BUN 16 06/07/2021 08:35 AM     Creatinine 0.87 06/07/2021 08:35 AM     BUN/Creatinine ratio 18 06/07/2021 08:35 AM     GFR est AA >60 06/07/2021 08:35 AM     GFR est non-AA >60 06/07/2021 08:35 AM     Calcium 9.4 06/07/2021 08:35 AM     Bilirubin, total 0.6 06/07/2021 08:35 AM     Alk.  phosphatase 48 06/07/2021 08:35 AM     Protein, total 6.9 06/07/2021 08:35 AM     Albumin 4.1 06/07/2021 08:35 AM     Globulin 2.8 06/07/2021 08:35 AM     A-G Ratio 1.5 06/07/2021 08:35 AM     ALT (SGPT) 38 06/07/2021 08:35 AM     AST (SGOT) 31 06/07/2021 08:35 AM               Lab Results   Component Value Date/Time     Cholesterol, total 106 07/28/2022 09:55 AM     HDL Cholesterol 51 07/28/2022 09:55 AM     LDL, calculated 44 07/28/2022 09:55 AM     VLDL, calculated 11 07/28/2022 09:55 AM     Triglyceride 55 07/28/2022 09:55 AM     CHOL/HDL Ratio 2.1 07/28/2022 09:55 AM               Lab Results   Component Value Date/Time     TSH 2.74 02/22/2022 10:30 AM               CARDIAC DIAGNOSTICS:      Cardiac Evaluation Includes:  I reviewed the test results below. CT Heart scan 3/25/22 - CAC score 1060; at 83 percentile rank     Treadmill stress test 6/30/22 - walked 5:55 (7 METS), No CP. Stress ECG was positive for ischemia. There was 1.5 mm of very slightly upsloping ST depression in inferior leads + V4-6 at peak exercise with slow resolution during recovery ---> I reviewed EKG strips - there were at most just borderline EKG changes               ASSESSMENT AND PLAN:      Assessment and Plan:        1) Severe Atherosclerosis   - CT Heart scan 3/25/22 - CAC score 1060; at 83 percentile rank  - Treadmill stress test 6/30/22 - walked 5:55 (7 METS), No CP. Stress ECG was positive for ischemia. There was 1.5 mm of very slightly upsloping ST depression in inferior leads + V4-6 at peak exercise with slow resolution during recovery ---> I reviewed EKG strips - there were at most just borderline EKG changes   - He denies CP and remains active   - Reviewed a healthy diet for prevention - which he has been following   - check a stress echo to workup for obstructive disease   - cont lipid control (statin) and ASA      2) Dyslipidemia   - intolerant to lipitor and crestor   - taking just low dose of pravastatin every other day plus Zetia  - his recent lipids look excellent on current regimen (LDL < 55)   ---> will have him take pravastatin daily and zetia and reassess labs in 4 months --> consider repetha if he cannot tolerate daily statins (try CoQ10 for cramps)      3) See me in 4 months      Works for [employer]). [place]  with grown kids.           Law Mensah MD, [facility] [address], [address]       [address], Jeri Johnston [address] [place] [address]  Ph: [phone]                               Ph [phone]        ADDENDUM   11/7/2022     Stress Echo 11/7/22 - Baseline LVEF 55-60%. Walked 10 min (13 METS). No CP. Stress ECG: Ischemic EKG changes with 2 mm slow upsloping ST depression at peak exercise in the inferolateral leads. Post-stress Echo: The post-stress echo showed worsened wall motion abnormalities compared to baseline which are indicative of myocardial ischemia. Mild hypokinesis of the following segments: mid anteroseptal. Severe hypokinesis of the following segments: apical septal.    Study Impression: Consistent with moderate LAD territory ischemia. I reviewed findings with patient. Findings suggest LAD ischemia. He does have a good workload and class 2 CONNER. I reviewed options of medical management of presumed CAD vs cardiac cath  to further define anatomy. We decided to proceed with a cardiac cath and he agrees to proceed (risks of MI, CVA, JAZMINE, death, etc).                     Electronically signed by Petar Torres MD at 10/05/22 1016   Electronically signed by Petar Torres MD at 11/07/22 [phone]

## 2022-12-05 NOTE — ROUTINE PROCESS
11:53 AM  Patient arrived. ID and allergies verified verbally with patient. Pt voices understanding of procedure to be performed. Consent obtained. Pt prepped for procedure. 12:50 PM  TRANSFER - OUT REPORT:    Verbal report given to Juan Zimmerman RN(name) on [facility]  being transferred to cath lab(unit) for ordered procedure       Report consisted of patients Situation, Background, Assessment and   Recommendations(SBAR). Information from the following report(s) SBAR was reviewed with the receiving nurse. Lines:   Peripheral IV 12/05/22 Anterior;Proximal;Right Forearm (Active)        Opportunity for questions and clarification was provided. Patient transported with:   Registered Nurse    1:45 PM  TRANSFER - IN REPORT:    Verbal report received from ALEXANDRE Espinoza(name) on [facility]  being received from cath lab(unit) for routine post - op      Report consisted of patients Situation, Background, Assessment and   Recommendations(SBAR). Information from the following report(s) Procedure Summary was reviewed with the receiving nurse. Opportunity for questions and clarification was provided. Assessment completed upon patients arrival to unit and care assumed. 2:55 PM  2 ml air released from TR Band. No bleeding or hematoma noted. Radial and Ulnar pulse on R wrist palpable. Pt tolerated well. Will continue to monitor. 3:00 PM  4 ml air released from TR Band. No bleeding or hematoma noted. Radial and Ulnar pulse on R wrist palpable. Pt tolerated well. Will continue to monitor. 3:05 PM  4 ml air released from TR Band. No bleeding or hematoma noted. Radial and Ulnar pulse on R wrist palpable. Pt tolerated well. Will continue to monitor. Air release completed. TR Band removed from R wrist. No bleeding or  Hematoma. Dressing applied. Wrist immobilizer in place. Radial and ulnar pulse remain palpable on affected extremity. Pt tolerated well.  Instructions given to pt regarding movement and activity restrictions. Pt voiced understanding. 3:50 PM  Discharge instructions reviewed with patient and family. Voiced understanding. Patient given copy of discharge instructions to take home.

## 2022-12-05 NOTE — Clinical Note
Multiple views of the right coronary artery Wife Carolina Thomason called to let Dr. Luis Alberto Cardozo know that Rafael Calderon has tested positive for Covid yesterday. He had a slight sore throat, headache and general cold symptoms. Carolina Thomason looking for directions for him.

## 2022-12-29 RX ORDER — EZETIMIBE 10 MG/1
TABLET ORAL
Qty: 90 TABLET | Refills: 0 | Status: SHIPPED | OUTPATIENT
Start: 2022-12-29

## 2022-12-30 DIAGNOSIS — E03.9 ACQUIRED HYPOTHYROIDISM: ICD-10-CM

## 2022-12-30 RX ORDER — LEVOTHYROXINE AND LIOTHYRONINE 57; 13.5 UG/1; UG/1
90 TABLET ORAL DAILY
Qty: 90 TABLET | Refills: 0 | Status: SHIPPED | OUTPATIENT
Start: 2022-12-30

## 2023-01-23 DIAGNOSIS — M25.562 CHRONIC PAIN OF BOTH KNEES: ICD-10-CM

## 2023-01-23 DIAGNOSIS — G89.29 CHRONIC PAIN OF BOTH KNEES: ICD-10-CM

## 2023-01-23 DIAGNOSIS — M25.561 CHRONIC PAIN OF BOTH KNEES: ICD-10-CM

## 2023-01-24 RX ORDER — DICLOFENAC SODIUM 50 MG/1
TABLET, DELAYED RELEASE ORAL
Qty: 60 TABLET | Refills: 0 | OUTPATIENT
Start: 2023-01-24

## 2023-01-25 NOTE — TELEPHONE ENCOUNTER
Call made to pt, no answer, message left. PCP switched to Dr Laquita Shaw per patient request. Dr Laquita Shaw approved changed. Pt needs an appt for medication refills.  Please schedule with Dr Laquita Shaw

## 2023-01-25 NOTE — TELEPHONE ENCOUNTER
Patient called re follow up appt prior to rx refill. Requests physician in office that can see him on alternate dates due to his work schedule.

## 2023-01-26 ENCOUNTER — PATIENT MESSAGE (OUTPATIENT)
Dept: FAMILY MEDICINE CLINIC | Age: 73
End: 2023-01-26

## 2023-01-27 ENCOUNTER — TELEPHONE (OUTPATIENT)
Dept: FAMILY MEDICINE CLINIC | Age: 73
End: 2023-01-27

## 2023-01-27 NOTE — TELEPHONE ENCOUNTER
PT states he needs a refill of Dicolfenac, but does not want an appt. Pt would like to speak with the supervisor about why he has to see a pcp for refills. Please advise.

## 2023-02-06 ENCOUNTER — OFFICE VISIT (OUTPATIENT)
Dept: CARDIOLOGY CLINIC | Age: 73
End: 2023-02-06

## 2023-02-06 ENCOUNTER — OFFICE VISIT (OUTPATIENT)
Dept: FAMILY MEDICINE CLINIC | Age: 73
End: 2023-02-06
Payer: MEDICARE

## 2023-02-06 VITALS
HEART RATE: 55 BPM | HEIGHT: 71 IN | SYSTOLIC BLOOD PRESSURE: 126 MMHG | DIASTOLIC BLOOD PRESSURE: 55 MMHG | WEIGHT: 166.2 LBS | TEMPERATURE: 98 F | OXYGEN SATURATION: 100 % | RESPIRATION RATE: 16 BRPM | BODY MASS INDEX: 23.27 KG/M2

## 2023-02-06 VITALS
DIASTOLIC BLOOD PRESSURE: 60 MMHG | HEART RATE: 78 BPM | SYSTOLIC BLOOD PRESSURE: 112 MMHG | BODY MASS INDEX: 23.1 KG/M2 | OXYGEN SATURATION: 100 % | HEIGHT: 71 IN | WEIGHT: 165 LBS

## 2023-02-06 DIAGNOSIS — G89.29 CHRONIC PAIN OF BOTH KNEES: Primary | ICD-10-CM

## 2023-02-06 DIAGNOSIS — M25.561 CHRONIC PAIN OF BOTH KNEES: Primary | ICD-10-CM

## 2023-02-06 DIAGNOSIS — E03.9 ACQUIRED HYPOTHYROIDISM: ICD-10-CM

## 2023-02-06 DIAGNOSIS — E78.5 DYSLIPIDEMIA: ICD-10-CM

## 2023-02-06 DIAGNOSIS — M25.562 CHRONIC PAIN OF BOTH KNEES: Primary | ICD-10-CM

## 2023-02-06 DIAGNOSIS — I25.118 ATHEROSCLEROTIC HEART DISEASE OF NATIVE CORONARY ARTERY WITH OTHER FORMS OF ANGINA PECTORIS (HCC): Primary | ICD-10-CM

## 2023-02-06 DIAGNOSIS — Z00.00 MEDICARE ANNUAL WELLNESS VISIT, SUBSEQUENT: ICD-10-CM

## 2023-02-06 DIAGNOSIS — R35.1 NOCTURIA: ICD-10-CM

## 2023-02-06 DIAGNOSIS — H91.93 BILATERAL HEARING LOSS, UNSPECIFIED HEARING LOSS TYPE: ICD-10-CM

## 2023-02-06 PROCEDURE — G8420 CALC BMI NORM PARAMETERS: HCPCS | Performed by: FAMILY MEDICINE

## 2023-02-06 PROCEDURE — G8427 DOCREV CUR MEDS BY ELIG CLIN: HCPCS | Performed by: SPECIALIST

## 2023-02-06 PROCEDURE — G8432 DEP SCR NOT DOC, RNG: HCPCS | Performed by: SPECIALIST

## 2023-02-06 PROCEDURE — G8536 NO DOC ELDER MAL SCRN: HCPCS | Performed by: FAMILY MEDICINE

## 2023-02-06 PROCEDURE — 3017F COLORECTAL CA SCREEN DOC REV: CPT | Performed by: SPECIALIST

## 2023-02-06 PROCEDURE — 1101F PT FALLS ASSESS-DOCD LE1/YR: CPT | Performed by: FAMILY MEDICINE

## 2023-02-06 PROCEDURE — G8427 DOCREV CUR MEDS BY ELIG CLIN: HCPCS | Performed by: FAMILY MEDICINE

## 2023-02-06 PROCEDURE — G8536 NO DOC ELDER MAL SCRN: HCPCS | Performed by: SPECIALIST

## 2023-02-06 PROCEDURE — G8420 CALC BMI NORM PARAMETERS: HCPCS | Performed by: SPECIALIST

## 2023-02-06 PROCEDURE — G8510 SCR DEP NEG, NO PLAN REQD: HCPCS | Performed by: FAMILY MEDICINE

## 2023-02-06 PROCEDURE — 99214 OFFICE O/P EST MOD 30 MIN: CPT | Performed by: SPECIALIST

## 2023-02-06 PROCEDURE — 1123F ACP DISCUSS/DSCN MKR DOCD: CPT | Performed by: SPECIALIST

## 2023-02-06 PROCEDURE — 3017F COLORECTAL CA SCREEN DOC REV: CPT | Performed by: FAMILY MEDICINE

## 2023-02-06 PROCEDURE — G0439 PPPS, SUBSEQ VISIT: HCPCS | Performed by: FAMILY MEDICINE

## 2023-02-06 PROCEDURE — 99214 OFFICE O/P EST MOD 30 MIN: CPT | Performed by: FAMILY MEDICINE

## 2023-02-06 PROCEDURE — 1123F ACP DISCUSS/DSCN MKR DOCD: CPT | Performed by: FAMILY MEDICINE

## 2023-02-06 PROCEDURE — 1101F PT FALLS ASSESS-DOCD LE1/YR: CPT | Performed by: SPECIALIST

## 2023-02-06 RX ORDER — ACETAMINOPHEN 500 MG
TABLET ORAL
COMMUNITY
End: 2023-02-06

## 2023-02-06 RX ORDER — MAGNESIUM 250 MG
TABLET ORAL
COMMUNITY

## 2023-02-06 RX ORDER — POTASSIUM CHLORIDE 750 MG/1
10 CAPSULE, EXTENDED RELEASE ORAL 2 TIMES DAILY
COMMUNITY

## 2023-02-06 RX ORDER — DICLOFENAC SODIUM 50 MG/1
50 TABLET, DELAYED RELEASE ORAL
Qty: 60 TABLET | Refills: 2 | Status: SHIPPED | OUTPATIENT
Start: 2023-02-06

## 2023-02-06 NOTE — PROGRESS NOTES
Chief Complaint   Patient presents with    Follow-up     4 mo     Cholesterol Problem     Vitals:    02/06/23 0847   BP: 112/60   BP 1 Location: Left upper arm   BP Patient Position: Sitting   Pulse: 78   Height: 5' 11\" (1.803 m)   Weight: 165 lb (74.8 kg)   SpO2: 100%       Chest pain denied     SOB denied     Palpitations denied     Swelling in hands/feet denied     Dizziness denied     Recent hospital stays denied     Refills denied

## 2023-02-06 NOTE — PROGRESS NOTES
Roderick Hicks MD. [facility] [place]          Patient: Sonia Rajput  : 1950      Today's Date: 2023        HISTORY OF PRESENT ILLNESS:     History of Present Illness:    He denies CP. He is active horse back riding. Class 1-2 CONNER. He feels well. He generally denies complaints. PAST MEDICAL HISTORY:     Past Medical History:   Diagnosis Date    Atherosclerosis     Depression     Dyslipidemia     Elevated coronary artery calcium score     Hypercholesterolemia     Skin cancer     Thyroid disease        Past Surgical History:   Procedure Laterality Date    HX HEENT      subcutaneous cyst removed from head    HX HEENT      skin cancer removed from face    HX TONSIL AND ADENOIDECTOMY               CURRENT MEDICATIONS:    .  Current Outpatient Medications   Medication Sig Dispense Refill    potassium chloride SA (MICRO-K) 10 mEq capsule Take 10 mEq by mouth two (2) times a day. acetaminophen (TYLENOL) 500 mg tablet Take  by mouth every six (6) hours as needed for Pain.      magnesium 250 mg tab Take  by mouth. OTHER daily. N-acetyl cysteine (NAC)      thyroid, Pork, (NP Thyroid) 90 mg tablet Take 1 Tablet by mouth daily. 90 Tablet 0    ezetimibe (ZETIA) 10 mg tablet TAKE ONE TABLET BY MOUTH ONCE A DAY 90 Tablet 0    coenzyme Q-10 (CO Q-10) 200 mg capsule Take 400 mg by mouth daily. aspirin 81 mg chewable tablet Take 1 Tablet by mouth daily. 30 Tablet 5    pravastatin (PRAVACHOL) 40 mg tablet TAKE ONE TABLET BY MOUTH NIGHTLY 90 Tablet 0    OTHER Z stack vitamins      diclofenac EC (VOLTAREN) 50 mg EC tablet Take 1 Tablet by mouth two (2) times daily as needed for Pain.  (Patient not taking: Reported on 2023) 60 Tablet 2    OTHER Hammer hemp 0.0%THC (Patient not taking: Reported on 2022)         Allergies   Allergen Reactions    Atorvastatin Myalgia    Crestor [Rosuvastatin] Myalgia    Zetia [Ezetimibe] Myalgia     \"muscle cramps\"         SOCIAL HISTORY:     Social History Tobacco Use    Smoking status: Former     Packs/day: 1.00     Years: 6.00     Pack years: 6.00     Types: Cigarettes    Smokeless tobacco: Never   Substance Use Topics    Alcohol use: Not Currently         FAMILY HISTORY:     Family History   Problem Relation Age of Onset    Alcohol abuse Mother     Dementia Mother     Suicide Father     Other Brother         rnp1 protein disorder with elevated inflamatory markers    Heart Disease Paternal Grandfather          REVIEW OF SYMPTOMS:     Review of Symptoms:  Constitutional: Negative for fever, chills  HEENT: Negative for nosebleeds, tinnitus, and vision changes. Respiratory: Negative for cough, wheezing  Cardiovascular: Negative for orthopnea, claudication, syncope, and PND. Gastrointestinal: Negative for abdominal pain, diarrhea, melena. Genitourinary: Negative for dysuria  Musculoskeletal: Negative for myalgias. Skin: Negative for rash  Heme: No problems bleeding. Neurological: Negative for speech change and focal weakness. PHYSICAL EXAM:     Physical Exam:  Visit Vitals  /60 (BP 1 Location: Left upper arm, BP Patient Position: Sitting)   Pulse 78   Ht 5' 11\" (1.803 m)   Wt 165 lb (74.8 kg)   SpO2 100%   BMI 23.01 kg/m²       Patient appears generally well, mood and affect are appropriate and pleasant. HEENT:  Hearing intact, non-icteric, normocephalic, atraumatic. Neck Exam: Supple, No JVD    Lung Exam: Clear to auscultation, even breath sounds. Cardiac Exam: Regular rate and rhythm with no murmur or rub  Abdomen: Soft, non-tender  Extremities: Moves all ext well. No lower extremity edema.   MSKTL: Overall good ROM ext  Skin: No significant rashes  Psych: Appropriate affect  Neuro - Grossly intact        LABS / OTHER STUDIES:     Lab Results   Component Value Date/Time    Sodium 143 11/17/2022 09:55 AM    Potassium 4.7 11/17/2022 09:55 AM    Chloride 110 (H) 11/17/2022 09:55 AM    CO2 30 11/17/2022 09:55 AM    Anion gap 3 (L) 11/17/2022 09:55 AM    Glucose 120 (H) 11/17/2022 09:55 AM    BUN 17 11/17/2022 09:55 AM    Creatinine 0.85 11/17/2022 09:55 AM    BUN/Creatinine ratio 20 11/17/2022 09:55 AM    GFR est AA >60 06/07/2021 08:35 AM    GFR est non-AA >60 06/07/2021 08:35 AM    Calcium 8.9 11/17/2022 09:55 AM    Bilirubin, total 0.8 11/17/2022 09:55 AM    Alk. phosphatase 55 11/17/2022 09:55 AM    Protein, total 6.4 11/17/2022 09:55 AM    Albumin 4.1 11/17/2022 09:55 AM    Globulin 2.3 11/17/2022 09:55 AM    A-G Ratio 1.8 11/17/2022 09:55 AM    ALT (SGPT) 71 11/17/2022 09:55 AM    AST (SGOT) 30 11/17/2022 09:55 AM       Lab Results   Component Value Date/Time    Cholesterol, total 106 07/28/2022 09:55 AM    Cholesterol, Total 115 11/17/2022 09:55 AM    HDL Cholesterol 51 07/28/2022 09:55 AM    LDL, calculated 44 07/28/2022 09:55 AM    VLDL, calculated 11 07/28/2022 09:55 AM    Triglyceride 55 07/28/2022 09:55 AM    CHOL/HDL Ratio 2.1 07/28/2022 09:55 AM       Lab Results   Component Value Date/Time    TSH 2.74 02/22/2022 10:30 AM       Component      Latest Ref Rng & Units 11/17/2022 11/17/2022           9:55 AM  9:55 AM   LDL-P      <1,000 nmol/L  627   LDL-C      0 - 99 mg/dL  64   HDL-C      >39 mg/dL  39 (L)   Triglycerides      0 - 149 mg/dL  55   Cholesterol, total      100 - 199 mg/dL  115   HDL-P (Total)      >=30.5 umol/L  29.9 (L)   Small LDL-P      <=527 nmol/L  311   LDL size      >20.5 nm  20.5 (L)   LP-IR score      <=45    40   Lipoprotein (a)      <75.0 nmol/L 54.3        CARDIAC DIAGNOSTICS:     Cardiac Evaluation Includes:  I reviewed the test results below. CT Heart scan 3/25/22 - CAC score 1060; at 83 percentile rank    Treadmill stress test 6/30/22 - walked 5:55 (7 METS), No CP. Stress ECG was positive for ischemia.  There was 1.5 mm of very slightly upsloping ST depression in inferior leads + V4-6 at peak exercise with slow resolution during recovery ---> I reviewed EKG strips - there were at most just borderline EKG changes     Stress Echo 11/7/22 - Baseline LVEF 55-60%. Walked 10 min (13 METS). No CP. Stress ECG: Ischemic EKG changes with 2 mm slow upsloping ST depression at peak exercise in the inferolateral leads. Post-stress Echo: The post-stress echo showed worsened wall motion abnormalities compared to baseline which are indicative of myocardial ischemia. Mild hypokinesis of the following segments: mid anteroseptal. Severe hypokinesis of the following segments: apical septal.    Study Impression: Consistent with moderate LAD territory ischemia. Cardiac Cath 12/5/22 - Diffuse moderate (~60%) LAD disease with iFR of 0.85 with diffuse ischemia on pull-back. Normal lvedp. --> Recommend to continue medical management of cad, no focal lesion that would benefit from pci. Borderline ischemia within lad territory, can consider lima to lad if he demonstrates increased ischemic symptoms. ASSESSMENT AND PLAN:     Assessment and Plan:      1) CAD and Severe Atherosclerosis   - CT Heart scan 3/25/22 - CAC score 1060; at 83 percentile rank  - Stress Echo 11/7/22 - Baseline LVEF 55-60%. Walked 10 min (13 METS). No CP. Stress ECG: Ischemic EKG changes. Mild hypokinesis of the following segments: mid anteroseptal. Severe hypokinesis of the following segments: apical septal.    Study Impression: Consistent with moderate LAD territory ischemia. - Cardiac Cath 12/5/22 - Diffuse moderate (~60%) LAD disease with iFR of 0.85 with diffuse ischemia on pull-back. Normal lvedp. --> Recommend to continue medical management of cad, no focal lesion that would benefit from pci.   Borderline ischemia within lad territory, can consider lima to lad if he demonstrates increased ischemic symptoms.  - He denies CP and remains active   - Reviewed a healthy diet for prevention - which he has been following (he follows mostly a plant based diet)   - cont lipid control (statin) and ASA   ---> we discussed [place] diet     2) Dyslipidemia   - intolerant to lipitor and crestor   - he is tolerating pravastatin and Zetia - no sig cramps   - recent LDL-P looks good --- continue regimen     3) See me in 6 months     Works for [employer]). [place].  with grown kids. Chelo Reis MD, [place] [address]  [address] [facility], [address]  [place] [address]  [address]  [address]  Ph: [phone]   Ph [phone]

## 2023-02-06 NOTE — PROGRESS NOTES
1. Have you been to the ER, urgent care clinic since your last visit? Hospitalized since your last visit? No     2. Have you seen or consulted any other health care providers outside of the [address] [place] [address] since your last visit? Include any pap smears or colon screening. No    3. For patients aged 39-70: Has the patient had a colonoscopy / FIT/ Cologuard? 2 years per patient [facility]     If the patient is female:    4. For patients aged 41-77: Has the patient had a mammogram within the past 2 years? NA - based on age or sex      11. For patients aged 21-65: Has the patient had a pap smear? NA - based on age or sex     Reviewed record in preparation for visit and have necessary documentation  Pt did not bring medication to office visit for review  Patient is accompanied by self I have received verbal consent from Ivan Fields to discuss any/all medical information while they are present in the room.     Goals that were addressed and/or need to be completed during or after this appointment include     Health Maintenance Due   Topic Date Due    COVID-19 Vaccine (1) Never done    DTaP/Tdap/Td series (1 - Tdap) Never done    Colorectal Cancer Screening Combo  Never done    Shingles Vaccine (1 of 2) Never done    AAA Screening 73-67 YO Male Smoking Patients  Never done    Pneumococcal 65+ years (1 - PCV) Never done    Medicare Yearly Exam  Never done    Flu Vaccine (1) 08/01/2022

## 2023-02-10 NOTE — PROGRESS NOTES
Progress Note    Patient: Tulio Mccormick MRN: [id number]  SSN: [id number]    YOB: 1950  Age: 72 y.o. Sex: male        Chief Complaint   Patient presents with    Medication Refill     he is a 72y.o. year old male who presents for follow up of chronic health conditions. Patient with hx of CAD, HLD hypothyroidism and chronic knee pain. He had a cardiac cath done on 12/5/22 (note reviewed). Encounter Diagnoses   Name Primary? Acquired hypothyroidism Yes    Chronic pain of both knees     Nocturia     Bilateral hearing loss, unspecified hearing loss type      BP Readings from Last 3 Encounters:   02/06/23 (!) 126/55   02/06/23 112/60   12/05/22 (!) 123/57     Wt Readings from Last 3 Encounters:   02/06/23 166 lb 3.2 oz (75.4 kg)   02/06/23 165 lb (74.8 kg)   12/05/22 161 lb 7.8 oz (73.2 kg)     Body mass index is 23.18 kg/m².     Lab Results   Component Value Date/Time    WBC 5.9 11/17/2022 09:55 AM    HGB 12.6 11/17/2022 09:55 AM    HCT 39.6 11/17/2022 09:55 AM    PLATELET 761 42/81/6154 09:55 AM    MCV 99.2 (H) 11/17/2022 09:55 AM     Lab Results   Component Value Date/Time    Cholesterol, total 106 07/28/2022 09:55 AM    Cholesterol, Total 115 11/17/2022 09:55 AM    HDL Cholesterol 51 07/28/2022 09:55 AM    LDL, calculated 44 07/28/2022 09:55 AM    Triglyceride 55 07/28/2022 09:55 AM    CHOL/HDL Ratio 2.1 07/28/2022 09:55 AM     Lab Results   Component Value Date/Time    TSH 2.74 02/22/2022 10:30 AM      Lab Results   Component Value Date/Time    Sodium 143 11/17/2022 09:55 AM    Potassium 4.7 11/17/2022 09:55 AM    Chloride 110 (H) 11/17/2022 09:55 AM    CO2 30 11/17/2022 09:55 AM    Anion gap 3 (L) 11/17/2022 09:55 AM    Glucose 120 (H) 11/17/2022 09:55 AM    BUN 17 11/17/2022 09:55 AM    Creatinine 0.85 11/17/2022 09:55 AM    BUN/Creatinine ratio 20 11/17/2022 09:55 AM    GFR est AA >60 06/07/2021 08:35 AM    GFR est non-AA >60 06/07/2021 08:35 AM    Calcium 8.9 11/17/2022 09:55 AM    Bilirubin, total 0.8 11/17/2022 09:55 AM    ALT (SGPT) 71 11/17/2022 09:55 AM    Alk.  phosphatase 55 11/17/2022 09:55 AM    Protein, total 6.4 11/17/2022 09:55 AM    Albumin 4.1 11/17/2022 09:55 AM    Globulin 2.3 11/17/2022 09:55 AM    A-G Ratio 1.8 11/17/2022 09:55 AM        Patient Active Problem List   Diagnosis Code    Dyslipidemia E78.5    Acquired hypothyroidism E03.9    Chronic right shoulder pain M25.511, G89.29    Astigmatism H52.209    Former smoker Z87.891    Digital mucous cyst M67.449    Chronic pain of both knees M25.561, M25.562, G89.29    Hx of basal cell carcinoma Z85.828    Erectile dysfunction N52.9    Elevated coronary artery calcium score R93.1    Hypercholesterolemia E78.00    Atherosclerosis I70.90    Atherosclerotic heart disease of native coronary artery with other forms of angina pectoris (HCC) I25.118     Past Surgical History:   Procedure Laterality Date    HX HEENT  1998    subcutaneous cyst removed from head    HX HEENT  1999    skin cancer removed from face    HX TONSIL AND ADENOIDECTOMY       Social History     Socioeconomic History    Marital status: SINGLE     Spouse name: Not on file    Number of children: Not on file    Years of education: Not on file    Highest education level: Not on file   Occupational History    Not on file   Tobacco Use    Smoking status: Former     Packs/day: 1.00     Years: 6.00     Pack years: 6.00     Types: Cigarettes    Smokeless tobacco: Never   Substance and Sexual Activity    Alcohol use: Not Currently    Drug use: Not on file    Sexual activity: Yes     Partners: Female   Other Topics Concern    Not on file   Social History Narrative    Not on file     Social Determinants of Health     Financial Resource Strain: Not on file   Food Insecurity: Not on file   Transportation Needs: Not on file   Physical Activity: Not on file   Stress: Not on file   Social Connections: Not on file   Intimate Partner Violence: Not on file   Housing Stability: Not on file Family History   Problem Relation Age of Onset    Alcohol abuse Mother     Dementia Mother     Suicide Father     Other Brother         rnp1 protein disorder with elevated inflamatory markers    Heart Disease Paternal Grandfather      Current Outpatient Medications   Medication Sig    potassium chloride SA (MICRO-K) 10 mEq capsule Take 10 mEq by mouth two (2) times a day. magnesium 250 mg tab Take  by mouth. OTHER daily. N-acetyl cysteine (NAC)    diclofenac EC (VOLTAREN) 50 mg EC tablet Take 1 Tablet by mouth two (2) times daily as needed for Pain. thyroid, Pork, (NP Thyroid) 90 mg tablet Take 1 Tablet by mouth daily. ezetimibe (ZETIA) 10 mg tablet TAKE ONE TABLET BY MOUTH ONCE A DAY    coenzyme Q-10 (CO Q-10) 200 mg capsule Take 400 mg by mouth daily. aspirin 81 mg chewable tablet Take 1 Tablet by mouth daily. pravastatin (PRAVACHOL) 40 mg tablet TAKE ONE TABLET BY MOUTH NIGHTLY    OTHER Z stack vitamins    OTHER Hammer hemp 0.0%THC (Patient not taking: Reported on 12/5/2022)     No current facility-administered medications for this visit.      Allergies   Allergen Reactions    Atorvastatin Myalgia    Crestor [Rosuvastatin] Myalgia    Zetia [Ezetimibe] Myalgia     \"muscle cramps\"       Review of Systems:  Constitutional: Negative for fatigue, malaise  Resp: Negative for cough, wheezing or SOB  CV: Negative for chest pain, dizziness or palpitations  GI: Negative for nausea or abdominal pain  MS: c/o chronic knee pain  Neuro: Negative for HA, weakness or paresthesia  Psych: Negative for depression or anxiety     Vitals:    02/06/23 1119   BP: (!) 126/55   Pulse: (!) 55   Resp: 16   Temp: 98 °F (36.7 °C)   TempSrc: Oral   SpO2: 100%   Weight: 166 lb 3.2 oz (75.4 kg)   Height: 5' 11\" (1.803 m)       Physical Examination:  General: Well developed, well nourished, in no acute distress  Head: Normocephalic, atraumatic  Eyes: Sclera clear, EOMI  Neck: Normal range of motion  Respiratory: Symmetrical, unlabored effort  Cardiovascular: Regular rate and rhythm  Extremities: Full range of motion, normal gait  Neurologic: No focal deficits  Psych: Active, alert and oriented. Affect appropriate       ICD-10-CM ICD-9-CM    1. Acquired hypothyroidism  E03.9 244.9 TSH 3RD GENERATION      T4, FREE      2. Chronic pain of both knees  M25.561 719.46 diclofenac EC (VOLTAREN) 50 mg EC tablet    M25.562 338.29     G89.29        3. Nocturia  R35.1 788.43 PSA, DIAGNOSTIC (PROSTATE SPECIFIC AG)      4. Bilateral hearing loss, unspecified hearing loss type  H91.93 389.9 REFERRAL TO AUDIOLOGY          Plan of care:  Diagnoses were discussed in detail with patient. Medication risks/benefits/side effects discussed with patient. All of the patient's questions were addressed and answered to apparent satisfaction. The patient understands and agrees with our plan of care. The patient knows to call back if they have questions about the plan of care or if symptoms change. The patient received an After-Visit Summary which contains VS, diagnoses, orders, allergy and medication lists. Future Appointments   Date Time Provider Shereen Chatman   8/7/2023 10:20 AM Yeimi Pierre MD [facility] BS AMB         Follow-up and Dispositions    Return if symptoms worsen or fail to improve. The following Annual Medicare Wellness Exam is distinct and separate from the medical evaluation and decision making. This is the Subsequent Medicare Annual Wellness Exam, performed 12 months or more after the Initial AWV or the last Subsequent AWV    I have reviewed the patient's medical history in detail and updated the computerized patient record. Assessment/Plan   Education and counseling provided:  Are appropriate based on today's review and evaluation  End-of-Life planning (with patient's consent)    1.  Subsequent Medicare Annual Wellness Exam       Depression Risk Factor Screening     3 most recent PHQ Screens 2/6/2023   Little interest or pleasure in doing things Not at all   Feeling down, depressed, irritable, or hopeless Not at all   Total Score PHQ 2 0       Alcohol & Drug Abuse Risk Screen    Do you average more than 1 drink per night or more than 7 drinks a week: No    In the past three months have you have had more than 4 drinks containing alcohol on one occasion: No          Functional Ability and Level of Safety    Hearing: The patient needs further evaluation. Activities of Daily Living: The home contains: no safety equipment. Patient does total self care      Ambulation: with mild difficulty     Fall Risk:  Fall Risk Assessment, last 12 mths 2/6/2023   Able to walk? Yes   Fall in past 12 months? 0   Do you feel unsteady? 0   Are you worried about falling 0      Abuse Screen:  Patient is not abused       Cognitive Screening    Has your family/caregiver stated any concerns about your memory: no     Screening deferred.     Health Maintenance Due     Health Maintenance Due   Topic Date Due    COVID-19 Vaccine (1) Never done    DTaP/Tdap/Td series (1 - Tdap) Never done    Colorectal Cancer Screening Combo  Never done    Medicare Yearly Exam  Never done       Patient Care Team   Patient Care Team:  Bryant Mary MD as PCP - General (Family Medicine)  Bryant Mary MD as PCP - [facility] Empaneled Provider    History     Patient Active Problem List   Diagnosis Code    Dyslipidemia E78.5    Acquired hypothyroidism E03.9    Chronic right shoulder pain M25.511, G89.29    Astigmatism H52.209    Former smoker Z87.891    Digital mucous cyst M67.449    Chronic pain of both knees M25.561, M25.562, G89.29    Hx of basal cell carcinoma Z85.828    Erectile dysfunction N52.9    Elevated coronary artery calcium score R93.1    Hypercholesterolemia E78.00    Atherosclerosis I70.90    Atherosclerotic heart disease of native coronary artery with other forms of angina pectoris ([facility] [address] I25.118     Past Medical History:   Diagnosis Date    Atherosclerosis Depression     Dyslipidemia     Elevated coronary artery calcium score     Hypercholesterolemia     Skin cancer     Thyroid disease       Past Surgical History:   Procedure Laterality Date    HX HEENT  1998    subcutaneous cyst removed from head    HX HEENT  1999    skin cancer removed from face    HX TONSIL AND ADENOIDECTOMY       Current Outpatient Medications   Medication Sig Dispense Refill    potassium chloride SA (MICRO-K) 10 mEq capsule Take 10 mEq by mouth two (2) times a day. magnesium 250 mg tab Take  by mouth. OTHER daily. N-acetyl cysteine (NAC)      diclofenac EC (VOLTAREN) 50 mg EC tablet Take 1 Tablet by mouth two (2) times daily as needed for Pain. 60 Tablet 2    thyroid, Pork, (NP Thyroid) 90 mg tablet Take 1 Tablet by mouth daily. 90 Tablet 0    ezetimibe (ZETIA) 10 mg tablet TAKE ONE TABLET BY MOUTH ONCE A DAY 90 Tablet 0    coenzyme Q-10 (CO Q-10) 200 mg capsule Take 400 mg by mouth daily. aspirin 81 mg chewable tablet Take 1 Tablet by mouth daily.  30 Tablet 5    pravastatin (PRAVACHOL) 40 mg tablet TAKE ONE TABLET BY MOUTH NIGHTLY 90 Tablet 0    OTHER Z stack vitamins       Allergies   Allergen Reactions    Atorvastatin Myalgia    Crestor [Rosuvastatin] Myalgia    Zetia [Ezetimibe] Myalgia     \"muscle cramps\"       Family History   Problem Relation Age of Onset    Alcohol abuse Mother     Dementia Mother     Suicide Father     Other Brother         rnp1 protein disorder with elevated inflamatory markers    Heart Disease Paternal Grandfather      Social History     Tobacco Use    Smoking status: Former     Packs/day: 1.00     Years: 6.00     Pack years: 6.00     Types: Cigarettes    Smokeless tobacco: Never   Substance Use Topics    Alcohol use: Not Currently         Heron Pan MD

## 2023-02-10 NOTE — PATIENT INSTRUCTIONS
Medicare Wellness Visit, Male    The best way to live healthy is to have a lifestyle where you eat a well-balanced diet, exercise regularly, limit alcohol use, and quit all forms of tobacco/nicotine, if applicable. Regular preventive services are another way to keep healthy. Preventive services (vaccines, screening tests, monitoring & exams) can help personalize your care plan, which helps you manage your own care. Screening tests can find health problems at the earliest stages, when they are easiest to treat. [facility] follows the current, evidence-based guidelines published by the [facility]) when recommending preventive services for our patients. Because we follow these guidelines, sometimes recommendations change over time as research supports it. (For example, a prostate screening blood test is no longer routinely recommended for men with no symptoms). Of course, you and your doctor may decide to screen more often for some diseases, based on your risk and co-morbidities (chronic disease you are already diagnosed with). Preventive services for you include:  - Medicare offers their members a free annual wellness visit, which is time for you and your primary care provider to discuss and plan for your preventive service needs.  Take advantage of this benefit every year!    -Over the age of 72 should receive the recommended pneumonia vaccines.   -All adults should have a flu vaccine yearly.  -All adults should receive a tetanus vaccine every 10 years.  -Over the age of 48 should receive the shingles vaccines.    -All adults should be screened once for Hepatitis C.  -All adults age 38-68 who are overweight should have a diabetes screening test once every three years.   -Other screening tests & preventive services for persons with diabetes include: an eye exam to screen for diabetic retinopathy, a kidney function test, a foot exam, and stricter control over your cholesterol.   -Cardiovascular screening for adults with routine risk involves an electrocardiogram (ECG) at intervals determined by the provider.     -Colorectal cancer screening should be done for adults age 43-69 with no increased risk factors for colorectal cancer. There are a number of acceptable methods of screening for this type of cancer. Each test has its own benefits and drawbacks. Discuss with your provider what is most appropriate for you during your annual wellness visit. The different tests include: colonoscopy (considered the best screening method), a fecal occult blood test, a fecal DNA test, and sigmoidoscopy.    -Lung cancer screening is recommended annually with a low dose CT scan for adults between age 54 and 68, who have smoked at least 30 pack years (equivalent of 1 pack per day for 30 days), and who is a current smoker or quit less than 15 years ago. -An Abdominal Aortic Aneurysm (AAA) Screening is recommended for men age 73-68 who has ever smoked in their lifetime.      Here is a list of your current Health Maintenance items (your personalized list of preventive services) with a due date:  Health Maintenance Due   Topic Date Due    COVID-19 Vaccine (1) Never done    DTaP/Tdap/Td  (1 - Tdap) Never done    Colorectal Screening  Never done

## 2023-02-16 DIAGNOSIS — I25.83 CORONARY ARTERY DISEASE DUE TO LIPID RICH PLAQUE: ICD-10-CM

## 2023-02-16 DIAGNOSIS — M25.562 CHRONIC PAIN OF BOTH KNEES: ICD-10-CM

## 2023-02-16 DIAGNOSIS — G89.29 CHRONIC PAIN OF BOTH KNEES: ICD-10-CM

## 2023-02-16 DIAGNOSIS — E78.2 MIXED HYPERLIPIDEMIA: ICD-10-CM

## 2023-02-16 DIAGNOSIS — M25.561 CHRONIC PAIN OF BOTH KNEES: ICD-10-CM

## 2023-02-16 DIAGNOSIS — I25.10 CORONARY ARTERY DISEASE DUE TO LIPID RICH PLAQUE: ICD-10-CM

## 2023-02-16 RX ORDER — PRAVASTATIN SODIUM 40 MG/1
40 TABLET ORAL
Qty: 90 TABLET | Refills: 1 | Status: SHIPPED | OUTPATIENT
Start: 2023-02-16

## 2023-03-24 ENCOUNTER — LAB ONLY (OUTPATIENT)
Dept: FAMILY MEDICINE CLINIC | Age: 73
End: 2023-03-24

## 2023-03-24 DIAGNOSIS — E03.9 ACQUIRED HYPOTHYROIDISM: ICD-10-CM

## 2023-03-24 DIAGNOSIS — R35.1 NOCTURIA: ICD-10-CM

## 2023-03-25 LAB
PSA SERPL-MCNC: 0.7 NG/ML (ref 0.01–4)
T4 FREE SERPL-MCNC: 0.8 NG/DL (ref 0.8–1.5)
TSH SERPL DL<=0.05 MIU/L-ACNC: 2.26 UIU/ML (ref 0.36–3.74)

## 2023-03-26 DIAGNOSIS — E03.9 ACQUIRED HYPOTHYROIDISM: ICD-10-CM

## 2023-03-26 RX ORDER — LEVOTHYROXINE, LIOTHYRONINE 57; 13.5 UG/1; UG/1
TABLET ORAL
Qty: 90 TABLET | Refills: 0 | Status: SHIPPED | OUTPATIENT
Start: 2023-03-26

## 2023-03-27 ENCOUNTER — TELEPHONE (OUTPATIENT)
Dept: FAMILY MEDICINE CLINIC | Age: 73
End: 2023-03-27

## 2023-03-27 NOTE — TELEPHONE ENCOUNTER
VM left for Pt to call the office regarding message: \"Results in reference range. \"    Please advise.

## 2023-05-08 ENCOUNTER — TELEPHONE (OUTPATIENT)
Facility: CLINIC | Age: 73
End: 2023-05-08

## 2023-05-08 DIAGNOSIS — S32.030A CLOSED COMPRESSION FRACTURE OF L3 LUMBAR VERTEBRA, INITIAL ENCOUNTER (HCC): ICD-10-CM

## 2023-05-08 DIAGNOSIS — S32.009A CLOSED FRACTURE OF TRANSVERSE PROCESS OF LUMBAR VERTEBRA, INITIAL ENCOUNTER (HCC): Primary | ICD-10-CM

## 2023-05-08 NOTE — TELEPHONE ENCOUNTER
Pt has a fractured vertebrae and need to see an Orthopedic. He scheduled the first available appt on 5/26 with Dr. Sarina Gonzales but need the order sooner. Pt also dropped off ED notes. He would like to stay within the [facility].

## 2023-05-25 RX ORDER — EZETIMIBE 10 MG/1
10 TABLET ORAL DAILY
COMMUNITY
Start: 2023-04-03 | End: 2023-06-26 | Stop reason: SDUPTHER

## 2023-05-25 RX ORDER — UBIDECARENONE 200 MG
400 CAPSULE ORAL DAILY
COMMUNITY

## 2023-05-25 RX ORDER — PRAVASTATIN SODIUM 40 MG
1 TABLET ORAL NIGHTLY
COMMUNITY
Start: 2023-02-16

## 2023-05-25 RX ORDER — ASPIRIN 81 MG/1
81 TABLET, CHEWABLE ORAL DAILY
COMMUNITY
Start: 2022-10-05

## 2023-05-25 RX ORDER — POTASSIUM CHLORIDE 750 MG/1
10 CAPSULE, EXTENDED RELEASE ORAL EVERY OTHER DAY
COMMUNITY

## 2023-05-25 RX ORDER — THYROID 90 MG/1
1 TABLET ORAL DAILY
COMMUNITY
Start: 2023-03-26 | End: 2023-06-25 | Stop reason: SDUPTHER

## 2023-05-26 ENCOUNTER — OFFICE VISIT (OUTPATIENT)
Facility: CLINIC | Age: 73
End: 2023-05-26
Payer: MEDICARE

## 2023-05-26 VITALS
RESPIRATION RATE: 18 BRPM | OXYGEN SATURATION: 100 % | HEART RATE: 69 BPM | TEMPERATURE: 97.9 F | BODY MASS INDEX: 24 KG/M2 | DIASTOLIC BLOOD PRESSURE: 79 MMHG | WEIGHT: 171.4 LBS | SYSTOLIC BLOOD PRESSURE: 129 MMHG | HEIGHT: 71 IN
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
## 2023-05-26 DIAGNOSIS — S09.90XD TRAUMATIC INJURY OF HEAD, SUBSEQUENT ENCOUNTER: ICD-10-CM

## 2023-05-26 DIAGNOSIS — F07.81 POST CONCUSSION SYNDROME: ICD-10-CM

## 2023-05-26 DIAGNOSIS — S32.030D CLOSED COMPRESSION FRACTURE OF L3 LUMBAR VERTEBRA WITH ROUTINE HEALING, SUBSEQUENT ENCOUNTER: Primary | ICD-10-CM

## 2023-05-26 PROCEDURE — G8427 DOCREV CUR MEDS BY ELIG CLIN: HCPCS | Performed by: FAMILY MEDICINE

## 2023-05-26 PROCEDURE — 1036F TOBACCO NON-USER: CPT | Performed by: FAMILY MEDICINE

## 2023-05-26 PROCEDURE — 3017F COLORECTAL CA SCREEN DOC REV: CPT | Performed by: FAMILY MEDICINE

## 2023-05-26 PROCEDURE — G8420 CALC BMI NORM PARAMETERS: HCPCS | Performed by: FAMILY MEDICINE

## 2023-05-26 PROCEDURE — 1123F ACP DISCUSS/DSCN MKR DOCD: CPT | Performed by: FAMILY MEDICINE

## 2023-05-26 PROCEDURE — 99215 OFFICE O/P EST HI 40 MIN: CPT | Performed by: FAMILY MEDICINE

## 2023-05-26 RX ORDER — MULTIVITAMIN WITH IRON
1 TABLET ORAL EVERY OTHER DAY
COMMUNITY

## 2023-05-26 RX ORDER — IBUPROFEN 600 MG/1
1 TABLET ORAL DAILY
COMMUNITY
Start: 2023-05-15

## 2023-05-26 SDOH — ECONOMIC STABILITY: HOUSING INSECURITY
IN THE LAST 12 MONTHS, WAS THERE A TIME WHEN YOU DID NOT HAVE A STEADY PLACE TO SLEEP OR SLEPT IN A SHELTER (INCLUDING NOW)?: NO

## 2023-05-26 SDOH — ECONOMIC STABILITY: FOOD INSECURITY: WITHIN THE PAST 12 MONTHS, YOU WORRIED THAT YOUR FOOD WOULD RUN OUT BEFORE YOU GOT MONEY TO BUY MORE.: NEVER TRUE

## 2023-05-26 SDOH — ECONOMIC STABILITY: FOOD INSECURITY: WITHIN THE PAST 12 MONTHS, THE FOOD YOU BOUGHT JUST DIDN'T LAST AND YOU DIDN'T HAVE MONEY TO GET MORE.: NEVER TRUE

## 2023-05-26 SDOH — ECONOMIC STABILITY: INCOME INSECURITY: HOW HARD IS IT FOR YOU TO PAY FOR THE VERY BASICS LIKE FOOD, HOUSING, MEDICAL CARE, AND HEATING?: NOT HARD AT ALL

## 2023-05-26 ASSESSMENT — PATIENT HEALTH QUESTIONNAIRE - PHQ9
SUM OF ALL RESPONSES TO PHQ QUESTIONS 1-9: 0
2. FEELING DOWN, DEPRESSED OR HOPELESS: 0
1. LITTLE INTEREST OR PLEASURE IN DOING THINGS: 0
SUM OF ALL RESPONSES TO PHQ QUESTIONS 1-9: 0
SUM OF ALL RESPONSES TO PHQ QUESTIONS 1-9: 0
SUM OF ALL RESPONSES TO PHQ9 QUESTIONS 1 & 2: 0
SUM OF ALL RESPONSES TO PHQ QUESTIONS 1-9: 0

## 2023-05-31 NOTE — PROGRESS NOTES
horse, subsequent encounter            Plan of care:  Diagnoses were discussed in detail with patient. Medications reviewed and appropriate. Patient to continue current prescribed medications as written. Medication risks/benefits/side effects discussed with patient. All of the patient's questions were addressed and answered to apparent satisfaction. The patient understands and agrees with our plan of care. The patient knows to call back if they have questions about the plan of care or if symptoms change. The patient received an After-Visit Summary which contains VS, diagnoses, orders, allergy and medication lists. Future Appointments   Date Time Provider Otf Fernandes   8/7/2023 10:20 AM Maggy Noguera MD [facility] BS AMB   9/27/2023  8:20 AM Remberto Louie MD [facility] BS AMB           Follow-up and Dispositions    Return in about 4 months (around 9/26/2023).

## 2023-06-23 ENCOUNTER — TELEPHONE (OUTPATIENT)
Facility: CLINIC | Age: 73
End: 2023-06-23

## 2023-06-23 DIAGNOSIS — F07.81 POST CONCUSSION SYNDROME: Primary | ICD-10-CM

## 2023-06-23 NOTE — TELEPHONE ENCOUNTER
----- Message from Makenna Magaña sent at 6/23/2023  9:11 AM EDT -----  Subject: Message to Provider    QUESTIONS  Information for Provider? pt needs return call about still having   concussion sx. pt not sure if he needs to be referred to neuro or not. please call asap. pt still not feeling well and is wanting to feel better.    pt doesn't have good cell service so if you can't get him on the phone   send mychart msg.   ---------------------------------------------------------------------------  --------------  [id number] [employer]  [phone]; OK to leave message on voicemail,OK to respond with electronic   message via [employer] portal (only for patients who have registered [employer]   account)  ---------------------------------------------------------------------------  --------------  SCRIPT ANSWERS  undefined

## 2023-06-24 ENCOUNTER — PATIENT MESSAGE (OUTPATIENT)
Facility: CLINIC | Age: 73
End: 2023-06-24

## 2023-06-25 RX ORDER — LEVOTHYROXINE AND LIOTHYRONINE 57; 13.5 UG/1; UG/1
90 TABLET ORAL DAILY
Qty: 90 TABLET | Refills: 1 | Status: SHIPPED | OUTPATIENT
Start: 2023-06-25 | End: 2023-06-30 | Stop reason: SDUPTHER

## 2023-06-26 RX ORDER — EZETIMIBE 10 MG/1
10 TABLET ORAL DAILY
Qty: 30 TABLET | Refills: 0 | Status: SHIPPED | OUTPATIENT
Start: 2023-06-26

## 2023-06-30 RX ORDER — LEVOTHYROXINE AND LIOTHYRONINE 57; 13.5 UG/1; UG/1
90 TABLET ORAL DAILY
Qty: 90 TABLET | Refills: 0 | Status: SHIPPED | OUTPATIENT
Start: 2023-06-30

## 2023-07-17 ENCOUNTER — OFFICE VISIT (OUTPATIENT)
Facility: CLINIC | Age: 73
End: 2023-07-17
Payer: MEDICARE

## 2023-07-17 VITALS
DIASTOLIC BLOOD PRESSURE: 74 MMHG | SYSTOLIC BLOOD PRESSURE: 127 MMHG | OXYGEN SATURATION: 100 % | RESPIRATION RATE: 14 BRPM | TEMPERATURE: 97.4 F | HEIGHT: 71 IN | BODY MASS INDEX: 23.13 KG/M2 | HEART RATE: 49 BPM | WEIGHT: 165.2 LBS

## 2023-07-17 DIAGNOSIS — F07.81 POST CONCUSSION SYNDROME: ICD-10-CM

## 2023-07-17 DIAGNOSIS — M54.50 ACUTE MIDLINE LOW BACK PAIN WITHOUT SCIATICA: Primary | ICD-10-CM

## 2023-07-17 DIAGNOSIS — S32.030S COMPRESSION FRACTURE OF L3 LUMBAR VERTEBRA, SEQUELA: ICD-10-CM

## 2023-07-17 PROCEDURE — 1123F ACP DISCUSS/DSCN MKR DOCD: CPT | Performed by: FAMILY MEDICINE

## 2023-07-17 PROCEDURE — 3017F COLORECTAL CA SCREEN DOC REV: CPT | Performed by: FAMILY MEDICINE

## 2023-07-17 PROCEDURE — G8428 CUR MEDS NOT DOCUMENT: HCPCS | Performed by: FAMILY MEDICINE

## 2023-07-17 PROCEDURE — G8420 CALC BMI NORM PARAMETERS: HCPCS | Performed by: FAMILY MEDICINE

## 2023-07-17 PROCEDURE — 99214 OFFICE O/P EST MOD 30 MIN: CPT | Performed by: FAMILY MEDICINE

## 2023-07-17 PROCEDURE — 1036F TOBACCO NON-USER: CPT | Performed by: FAMILY MEDICINE

## 2023-07-17 RX ORDER — CYCLOBENZAPRINE HCL 10 MG
TABLET ORAL
COMMUNITY
Start: 2023-05-16

## 2023-07-17 RX ORDER — IBUPROFEN 600 MG/1
600 TABLET ORAL EVERY 6 HOURS PRN
COMMUNITY
Start: 2023-05-11 | End: 2023-07-23 | Stop reason: ALTCHOICE

## 2023-07-17 NOTE — PROGRESS NOTES
1. \"Have you been to the ER, urgent care clinic since your last visit? Hospitalized since your last visit? \" NO    2. \"Have you seen or consulted any other health care providers outside of the [address] [place] [address] since your last visit? \" yes    3. For patients aged 43-73: Has the patient had a colonoscopy / FIT/ Cologuard? NO      If the patient is female:    4. For patients aged 43-66: Has the patient had a mammogram within the past 2 years? N/A      5. For patients aged 21-65: Has the patient had a pap smear?  N/A    Health Maintenance Due   Topic Date Due    COVID-19 Vaccine (1) Never done    DTaP/Tdap/Td vaccine (1 - Tdap) Never done    Colorectal Cancer Screen  Never done    Shingles vaccine (1 of 2) Never done    Pneumococcal 65+ years Vaccine (1 - PCV) Never done    AAA screen  Never done

## 2023-07-19 RX ORDER — EZETIMIBE 10 MG/1
TABLET ORAL
Qty: 90 TABLET | Refills: 1 | Status: SHIPPED | OUTPATIENT
Start: 2023-07-19

## 2023-08-07 ENCOUNTER — OFFICE VISIT (OUTPATIENT)
Age: 73
End: 2023-08-07
Payer: MEDICARE

## 2023-08-07 VITALS
HEIGHT: 71 IN | WEIGHT: 165 LBS | DIASTOLIC BLOOD PRESSURE: 70 MMHG | HEART RATE: 59 BPM | OXYGEN SATURATION: 97 % | BODY MASS INDEX: 23.1 KG/M2 | SYSTOLIC BLOOD PRESSURE: 110 MMHG

## 2023-08-07 DIAGNOSIS — E78.5 HYPERLIPIDEMIA, UNSPECIFIED HYPERLIPIDEMIA TYPE: ICD-10-CM

## 2023-08-07 DIAGNOSIS — R00.1 BRADYCARDIA: ICD-10-CM

## 2023-08-07 DIAGNOSIS — I25.118 ATHEROSCLEROTIC HEART DISEASE OF NATIVE CORONARY ARTERY WITH OTHER FORMS OF ANGINA PECTORIS (HCC): Primary | ICD-10-CM

## 2023-08-07 PROCEDURE — 1123F ACP DISCUSS/DSCN MKR DOCD: CPT

## 2023-08-07 PROCEDURE — 1036F TOBACCO NON-USER: CPT

## 2023-08-07 PROCEDURE — 3017F COLORECTAL CA SCREEN DOC REV: CPT

## 2023-08-07 PROCEDURE — G8427 DOCREV CUR MEDS BY ELIG CLIN: HCPCS

## 2023-08-07 PROCEDURE — 99214 OFFICE O/P EST MOD 30 MIN: CPT

## 2023-08-07 PROCEDURE — G8420 CALC BMI NORM PARAMETERS: HCPCS

## 2023-08-07 NOTE — PROGRESS NOTES
Patient: Sonia Cross  : 1950    Primary Cardiologist: Sara Lynch MD. [facility]  Last Office Visit: 23    Today's Date: 2023    HISTORY OF PRESENT ILLNESS:     History of Present Illness:  Presents today for follow-up. Recently had a fall off of one of his horses, had a concussion. Continues with symptoms related to concussion, says balance has been off. Noted that pulse was 46 bpm in PCP office recently, also notes feeling lightheaded at times. Unsure if related to concussion. Does not monitor HR at home. Denies chest pain. Continues with Class 1-2 DAMICO.         PAST MEDICAL HISTORY:     Past Medical History:   Diagnosis Date    Atherosclerosis     Depression     Dyslipidemia     Elevated coronary artery calcium score     Hypercholesterolemia     Skin cancer     Thyroid disease        Past Surgical History:   Procedure Laterality Date    HEENT      subcutaneous cyst removed from head    HEENT      skin cancer removed from face    TONSILLECTOMY AND ADENOIDECTOMY         CURRENT MEDICATIONS:      Current Outpatient Medications   Medication Sig Dispense Refill    ezetimibe (ZETIA) 10 MG tablet TAKE ONE TABLET BY MOUTH EVERY DAY 90 tablet 1    diclofenac (VOLTAREN) 50 MG EC tablet TAKE ONE TABLET BY MOUTH TWICE DAILY AS NEEDED FOR PAIN      thyroid (ARMOUR) 90 MG tablet Take 1 tablet by mouth daily 90 tablet 0    magnesium (MAGNESIUM-OXIDE) 250 MG TABS tablet Take 1 tablet by mouth every other day      ibuprofen (ADVIL;MOTRIN) 600 MG tablet Take 1 tablet by mouth daily      NONFORMULARY Take 2 tablets by mouth daily Z Stack      NONFORMULARY Take 1 capsule by mouth 2 times daily NAC      NONFORMULARY Take 1 tablet by mouth daily Spermidine      Menaquinone-7 (VITAMIN K2 PO) Take 1 capsule by mouth nightly      Nattokinase 100 MG CAPS Take 2 capsules by mouth daily      NONFORMULARY Take 1 capsule by mouth daily Gopi Red      coenzyme Q10 200 MG CAPS capsule Take 2 capsules by mouth daily

## 2023-08-07 NOTE — PROGRESS NOTES
Chief Complaint   Patient presents with    Follow-up     6 mo     Hyperlipidemia     Vitals:    08/07/23 1005   BP: 110/70   Site: Left Upper Arm   Position: Sitting   Pulse: 59   SpO2: 97%   Weight: 165 lb (74.8 kg)   Height: 5' 11\" (1.803 m)         Chest pain denied     SOB denied     Palpitations denied     Swelling in hands/feet denied     Dizziness denied     Recent hospital stays denied     Refills denied

## 2023-08-14 DIAGNOSIS — I25.118 ATHEROSCLEROTIC HEART DISEASE OF NATIVE CORONARY ARTERY WITH OTHER FORMS OF ANGINA PECTORIS (HCC): ICD-10-CM

## 2023-08-15 LAB
ANION GAP SERPL CALC-SCNC: 5 MMOL/L (ref 5–15)
BUN SERPL-MCNC: 14 MG/DL (ref 6–20)
BUN/CREAT SERPL: 18 (ref 12–20)
CALCIUM SERPL-MCNC: 9.1 MG/DL (ref 8.5–10.1)
CHLORIDE SERPL-SCNC: 109 MMOL/L (ref 97–108)
CHOLEST SERPL-MCNC: 118 MG/DL
CO2 SERPL-SCNC: 25 MMOL/L (ref 21–32)
CREAT SERPL-MCNC: 0.79 MG/DL (ref 0.7–1.3)
GLUCOSE SERPL-MCNC: 94 MG/DL (ref 65–100)
HDLC SERPL-MCNC: 48 MG/DL
HDLC SERPL: 2.5 (ref 0–5)
LDLC SERPL CALC-MCNC: 56.6 MG/DL (ref 0–100)
POTASSIUM SERPL-SCNC: 4.3 MMOL/L (ref 3.5–5.1)
SODIUM SERPL-SCNC: 139 MMOL/L (ref 136–145)
TRIGL SERPL-MCNC: 67 MG/DL
VLDLC SERPL CALC-MCNC: 13.4 MG/DL

## 2023-08-15 NOTE — RESULT ENCOUNTER NOTE
Dear Mr. [facility] [employer]! Your test results are stable. Please let me know if you have any questions.    Best Regards,  AJAY Hurtado NP

## 2023-08-17 DIAGNOSIS — F07.81 POST CONCUSSION SYNDROME: Primary | ICD-10-CM

## 2023-09-01 DIAGNOSIS — M54.50 ACUTE MIDLINE LOW BACK PAIN WITHOUT SCIATICA: ICD-10-CM

## 2023-09-06 RX ORDER — PRAVASTATIN SODIUM 40 MG
TABLET ORAL NIGHTLY
Qty: 90 TABLET | Refills: 1 | Status: SHIPPED | OUTPATIENT
Start: 2023-09-06

## 2023-09-27 ENCOUNTER — OFFICE VISIT (OUTPATIENT)
Facility: CLINIC | Age: 73
End: 2023-09-27
Payer: MEDICARE

## 2023-09-27 VITALS
WEIGHT: 168 LBS | DIASTOLIC BLOOD PRESSURE: 73 MMHG | HEIGHT: 71 IN | RESPIRATION RATE: 14 BRPM | OXYGEN SATURATION: 99 % | SYSTOLIC BLOOD PRESSURE: 121 MMHG | TEMPERATURE: 97.8 F | HEART RATE: 60 BPM | BODY MASS INDEX: 23.52 KG/M2

## 2023-09-27 DIAGNOSIS — E03.9 ACQUIRED HYPOTHYROIDISM: Primary | ICD-10-CM

## 2023-09-27 DIAGNOSIS — E78.00 PURE HYPERCHOLESTEROLEMIA, UNSPECIFIED: ICD-10-CM

## 2023-09-27 DIAGNOSIS — F07.81 POST CONCUSSION SYNDROME: ICD-10-CM

## 2023-09-27 PROCEDURE — G8420 CALC BMI NORM PARAMETERS: HCPCS | Performed by: FAMILY MEDICINE

## 2023-09-27 PROCEDURE — 3017F COLORECTAL CA SCREEN DOC REV: CPT | Performed by: FAMILY MEDICINE

## 2023-09-27 PROCEDURE — 99214 OFFICE O/P EST MOD 30 MIN: CPT | Performed by: FAMILY MEDICINE

## 2023-09-27 PROCEDURE — 1123F ACP DISCUSS/DSCN MKR DOCD: CPT | Performed by: FAMILY MEDICINE

## 2023-09-27 PROCEDURE — G8427 DOCREV CUR MEDS BY ELIG CLIN: HCPCS | Performed by: FAMILY MEDICINE

## 2023-09-27 PROCEDURE — 1036F TOBACCO NON-USER: CPT | Performed by: FAMILY MEDICINE

## 2023-09-27 RX ORDER — ACETAMINOPHEN 500 MG
500 TABLET ORAL EVERY 6 HOURS PRN
COMMUNITY

## 2023-09-27 NOTE — PROGRESS NOTES
1. \"Have you been to the ER, urgent care clinic since your last visit? Hospitalized since your last visit? \" NO    2. \"Have you seen or consulted any other health care providers outside of the [address] [place] [address] since your last visit? \" Yes [facility]     3. For patients aged 43-73: Has the patient had a colonoscopy / FIT/ Cologuard? YES 2 years? If the patient is female:    4. For patients aged 43-66: Has the patient had a mammogram within the past 2 years? N/A      5. For patients aged 21-65: Has the patient had a pap smear?  N/A    Health Maintenance Due   Topic Date Due    COVID-19 Vaccine (1) Never done    DTaP/Tdap/Td vaccine (1 - Tdap) Never done    Colorectal Cancer Screen  Never done    Shingles vaccine (1 of 2) Never done    Pneumococcal 65+ years Vaccine (1 - PCV) Never done    AAA screen  Never done    Flu vaccine (1) 08/01/2023

## 2023-09-29 RX ORDER — THYROID 90 MG/1
90 TABLET ORAL DAILY
Qty: 90 TABLET | Refills: 0 | Status: SHIPPED | OUTPATIENT
Start: 2023-09-29

## 2023-09-29 RX ORDER — EZETIMIBE 10 MG/1
10 TABLET ORAL DAILY
Qty: 90 TABLET | Refills: 1 | OUTPATIENT
Start: 2023-09-29

## 2023-09-29 RX ORDER — EZETIMIBE 10 MG/1
10 TABLET ORAL DAILY
Qty: 90 TABLET | Refills: 1 | Status: SHIPPED | OUTPATIENT
Start: 2023-09-29

## 2023-09-29 RX ORDER — THYROID 90 MG/1
90 TABLET ORAL DAILY
Qty: 90 TABLET | Refills: 0 | OUTPATIENT
Start: 2023-09-29

## 2023-10-24 DIAGNOSIS — M54.2 NECK PAIN: Primary | ICD-10-CM

## 2023-11-10 ENCOUNTER — OFFICE VISIT (OUTPATIENT)
Age: 73
End: 2023-11-10

## 2023-11-10 VITALS
HEART RATE: 61 BPM | HEIGHT: 71 IN | DIASTOLIC BLOOD PRESSURE: 68 MMHG | WEIGHT: 167 LBS | BODY MASS INDEX: 23.38 KG/M2 | RESPIRATION RATE: 16 BRPM | SYSTOLIC BLOOD PRESSURE: 133 MMHG

## 2023-11-10 DIAGNOSIS — R26.89 IMBALANCE: Primary | ICD-10-CM

## 2023-11-10 DIAGNOSIS — R41.89 BRAIN FOG: ICD-10-CM

## 2023-11-10 DIAGNOSIS — S09.90XS TRAUMATIC INJURY OF HEAD, SEQUELA: ICD-10-CM

## 2023-11-10 ASSESSMENT — PATIENT HEALTH QUESTIONNAIRE - PHQ9
SUM OF ALL RESPONSES TO PHQ QUESTIONS 1-9: 0
2. FEELING DOWN, DEPRESSED OR HOPELESS: 0
1. LITTLE INTEREST OR PLEASURE IN DOING THINGS: 0
SUM OF ALL RESPONSES TO PHQ QUESTIONS 1-9: 0
SUM OF ALL RESPONSES TO PHQ9 QUESTIONS 1 & 2: 0
SUM OF ALL RESPONSES TO PHQ QUESTIONS 1-9: 0
SUM OF ALL RESPONSES TO PHQ QUESTIONS 1-9: 0

## 2023-11-10 NOTE — PATIENT INSTRUCTIONS
[facility] Neuro[facility] Test Result Communication    Test results are available in [facility]. [employer] is the patient portal into our electronic health record. This feature allows patients to see diagnostic test results, immunizations, allergies, past medical and surgical history, current medications, and send messages directly to providers. Our team members at the  can provide additional information and assist with registration. The [employer] support team can be reached at [phone]. In some cases, a provider might need time to explain the results in detail during a follow-up appointment. This might include additional information or context that will help patients understand the reason for next steps in the plan of care recommended by their provider. If a patient chooses to receive diagnostic testing at an imaging center outside of the [facility], it is the patient's responsibility to bring the imaging report and disc to their [facility] follow-up appointment. If the test results reveal anything that is particularly noteworthy, we will contact you to discuss the matter and, if necessary, schedule a follow-up appointment at an earlier date. If you have not received your test results by [employer] or other communication within 7 days, please contact our office. An inquiry can be sent to your provider using [employer]. Alternatively, appointments can be scheduled via telephone to review results. If a follow-up appointment to review results has not been scheduled, [facility] office can be reached at [phone]. For appointments at our [facility] or [facility] office, please call [phone]. [address] [facility]   Statement to Patients  April 1, 2014      In an effort to ensure the large volume of patient prescription refills is processed in the most efficient and expeditious

## 2023-11-10 NOTE — PROGRESS NOTES
[facility] and [address] [facility] Vinicius at [facility] at [address], [address] [place] [address]  [phone] Office  [phone] Facsimile           Referring:     Chief Complaint   Patient presents with    New Patient    Concussion     Most recent occurred after falling from horse in May. Reports multiple concussions over the years. Shortly after would get vertigo when supine, difficulty with far vision, imbalance, persistent brain fog. A lot of sx have gotten better but still occurs. It is now difficult to tell if this is r/t concussion or age-related changes     77-year-old gentleman presents today for initial neurologic consultation regarding head trauma and symptoms subsequent to such. He notes he has had a number of accidents throughout his life and typically with come back from them rather quickly. He notes that he is a bit disconcerted with the lack of ability to recover from the accident he had earlier this year. In May he was out with a horse. He was bucked off a horse. His last recollection was being in the air and realizing he was off the horse and then he was on the ground. He landed on his buttock and whiplashed back striking the left backside of his head. His helmet was broken. He had a loss of consciousness for unknown period of time. He then had pain in his low back and between his shoulder blades. He had blood from somewhere but he is unsure. He was able to find his horse and put it away. He did not go to the emergency room that evening but the next day his friend took him to the ED. He was imbalance. No headache. He was diagnosed with lumbar fractures and a concussion. He was told to follow-up with Ortho. He followed up with Dr. Charlene Cui and orthopedics.   He took a week off from work and then when he returned he had difficulty in

## 2023-11-17 ENCOUNTER — PROCEDURE VISIT (OUTPATIENT)
Age: 73
End: 2023-11-17

## 2023-11-17 DIAGNOSIS — R41.89 BRAIN FOG: ICD-10-CM

## 2023-11-17 DIAGNOSIS — S09.90XS TRAUMATIC INJURY OF HEAD, SEQUELA: Primary | ICD-10-CM

## 2023-11-26 ENCOUNTER — HOSPITAL ENCOUNTER (OUTPATIENT)
Facility: HOSPITAL | Age: 73
Discharge: HOME | End: 2023-11-29
Attending: PSYCHIATRY & NEUROLOGY
Payer: MEDICARE

## 2023-11-26 DIAGNOSIS — R26.89 IMBALANCE: ICD-10-CM

## 2023-11-26 DIAGNOSIS — S09.90XS TRAUMATIC INJURY OF HEAD, SEQUELA: ICD-10-CM

## 2023-11-26 DIAGNOSIS — R41.89 BRAIN FOG: ICD-10-CM

## 2023-11-26 PROCEDURE — 70551 MRI BRAIN STEM W/O DYE: CPT

## 2023-12-27 DIAGNOSIS — E03.9 ACQUIRED HYPOTHYROIDISM: ICD-10-CM

## 2023-12-27 RX ORDER — THYROID 90 MG/1
90 TABLET ORAL DAILY
Qty: 90 TABLET | Refills: 1 | Status: SHIPPED | OUTPATIENT
Start: 2023-12-27

## 2024-01-16 DIAGNOSIS — M54.50 ACUTE MIDLINE LOW BACK PAIN WITHOUT SCIATICA: ICD-10-CM

## 2024-01-16 DIAGNOSIS — E78.00 PURE HYPERCHOLESTEROLEMIA, UNSPECIFIED: ICD-10-CM

## 2024-01-16 RX ORDER — EZETIMIBE 10 MG/1
10 TABLET ORAL DAILY
Qty: 90 TABLET | Refills: 1 | Status: SHIPPED | OUTPATIENT
Start: 2024-01-16

## 2024-02-08 ENCOUNTER — OFFICE VISIT (OUTPATIENT)
Age: 74
End: 2024-02-08
Payer: MEDICARE

## 2024-02-08 VITALS
HEART RATE: 73 BPM | OXYGEN SATURATION: 98 % | DIASTOLIC BLOOD PRESSURE: 80 MMHG | SYSTOLIC BLOOD PRESSURE: 118 MMHG | HEIGHT: 71 IN | WEIGHT: 168.2 LBS | BODY MASS INDEX: 23.55 KG/M2

## 2024-02-08 DIAGNOSIS — R93.1 ELEVATED CORONARY ARTERY CALCIUM SCORE: ICD-10-CM

## 2024-02-08 DIAGNOSIS — I25.118 CORONARY ARTERY DISEASE OF NATIVE ARTERY OF NATIVE HEART WITH STABLE ANGINA PECTORIS (HCC): Primary | ICD-10-CM

## 2024-02-08 DIAGNOSIS — E78.5 HYPERLIPIDEMIA, UNSPECIFIED HYPERLIPIDEMIA TYPE: ICD-10-CM

## 2024-02-08 DIAGNOSIS — I70.90 ATHEROSCLEROSIS: ICD-10-CM

## 2024-02-08 PROCEDURE — 99214 OFFICE O/P EST MOD 30 MIN: CPT | Performed by: SPECIALIST

## 2024-02-08 PROCEDURE — 93005 ELECTROCARDIOGRAM TRACING: CPT | Performed by: SPECIALIST

## 2024-02-08 NOTE — PROGRESS NOTES
Chief Complaint   Patient presents with    Follow-up     6 month    Hyperlipidemia     Vitals:    02/08/24 1302   Weight: 76.3 kg (168 lb 3.2 oz)   Height: 1.803 m (5' 11\")     No active chest pain  No recent hospitalizations/urgent care visits  No refills needed  
auscultation, even breath sounds.   Cardiac Exam: Regular rate and rhythm with no murmur or rub  Abdomen: Soft, non-tender  Extremities: Moves all ext well. No lower extremity edema.  MSKTL: Overall good ROM ext  Skin: No significant rashes  Psych: Appropriate affect  Neuro - Grossly intact    LABS / OTHER STUDIES:     Lab Results   Component Value Date     08/14/2023    K 4.3 08/14/2023     (H) 08/14/2023    CO2 25 08/14/2023    BUN 14 08/14/2023    CREATININE 0.79 08/14/2023    GLUCOSE 94 08/14/2023    CALCIUM 9.1 08/14/2023    PROT 6.4 11/17/2022    LABALBU 4.1 11/17/2022    BILITOT 0.8 11/17/2022    ALKPHOS 55 11/17/2022    AST 30 11/17/2022    ALT 71 11/17/2022    LABGLOM >60 08/14/2023    GFRAA >60 06/07/2021    AGRATIO 1.8 11/17/2022    GLOB 2.3 11/17/2022       Lab Results   Component Value Date    WBC 5.9 11/17/2022    HGB 12.6 11/17/2022    HCT 39.6 11/17/2022    MCV 99.2 (H) 11/17/2022     11/17/2022     Lab Results   Component Value Date    CHOL 118 08/14/2023    TRIG 67 08/14/2023    HDL 48 08/14/2023    LDLCALC 56.6 08/14/2023    CHOLHDLRATIO 2.5 08/14/2023     Lab Results   Component Value Date    TSH 2.26 03/24/2023           CARDIAC DIAGNOSTICS:     Cardiac Evaluation Includes:  I reviewed the test results below.    EKG 2/8/24 - NSR, normal       CT Heart scan 3/25/22 - CAC score 1060; at 83 percentile rank      Treadmill stress test 6/30/22 - walked 5:55 (7 METS), No CP.   Stress ECG was positive for ischemia. There was 1.5 mm of very slightly upsloping  ST depression in inferior leads + V4-6 at peak exercise with slow resolution during recovery ---> I reviewed EKG strips - there were at most just borderline EKG changes       Stress Echo 11/7/22 - Baseline LVEF 55-60%.  Walked 10 min (13 METS).  No CP.    Stress ECG: Ischemic EKG changes with 2 mm slow upsloping ST depression at peak exercise in the inferolateral leads.    Post-stress Echo: The post-stress echo showed worsened

## 2024-02-27 SDOH — HEALTH STABILITY: PHYSICAL HEALTH: ON AVERAGE, HOW MANY MINUTES DO YOU ENGAGE IN EXERCISE AT THIS LEVEL?: 20 MIN

## 2024-02-27 SDOH — HEALTH STABILITY: PHYSICAL HEALTH: ON AVERAGE, HOW MANY DAYS PER WEEK DO YOU ENGAGE IN MODERATE TO STRENUOUS EXERCISE (LIKE A BRISK WALK)?: 4 DAYS

## 2024-02-27 ASSESSMENT — PATIENT HEALTH QUESTIONNAIRE - PHQ9
SUM OF ALL RESPONSES TO PHQ QUESTIONS 1-9: 0
SUM OF ALL RESPONSES TO PHQ9 QUESTIONS 1 & 2: 0
SUM OF ALL RESPONSES TO PHQ QUESTIONS 1-9: 0
2. FEELING DOWN, DEPRESSED OR HOPELESS: 0
1. LITTLE INTEREST OR PLEASURE IN DOING THINGS: 0

## 2024-02-27 ASSESSMENT — LIFESTYLE VARIABLES
HOW MANY STANDARD DRINKS CONTAINING ALCOHOL DO YOU HAVE ON A TYPICAL DAY: PATIENT DOES NOT DRINK
HOW OFTEN DO YOU HAVE SIX OR MORE DRINKS ON ONE OCCASION: 1
HOW OFTEN DO YOU HAVE A DRINK CONTAINING ALCOHOL: 1
HOW OFTEN DO YOU HAVE A DRINK CONTAINING ALCOHOL: NEVER
HOW MANY STANDARD DRINKS CONTAINING ALCOHOL DO YOU HAVE ON A TYPICAL DAY: 0

## 2024-02-29 DIAGNOSIS — M54.50 ACUTE MIDLINE LOW BACK PAIN WITHOUT SCIATICA: ICD-10-CM

## 2024-03-01 ENCOUNTER — OFFICE VISIT (OUTPATIENT)
Facility: CLINIC | Age: 74
End: 2024-03-01
Payer: MEDICARE

## 2024-03-01 VITALS
HEART RATE: 76 BPM | RESPIRATION RATE: 14 BRPM | OXYGEN SATURATION: 100 % | TEMPERATURE: 97.7 F | SYSTOLIC BLOOD PRESSURE: 131 MMHG | DIASTOLIC BLOOD PRESSURE: 81 MMHG | WEIGHT: 172.6 LBS | HEIGHT: 71 IN | BODY MASS INDEX: 24.16 KG/M2

## 2024-03-01 DIAGNOSIS — E78.00 PURE HYPERCHOLESTEROLEMIA, UNSPECIFIED: ICD-10-CM

## 2024-03-01 DIAGNOSIS — M54.50 CHRONIC MIDLINE LOW BACK PAIN WITHOUT SCIATICA: Primary | ICD-10-CM

## 2024-03-01 DIAGNOSIS — Z00.00 MEDICARE ANNUAL WELLNESS VISIT, SUBSEQUENT: ICD-10-CM

## 2024-03-01 DIAGNOSIS — G89.29 CHRONIC MIDLINE LOW BACK PAIN WITHOUT SCIATICA: Primary | ICD-10-CM

## 2024-03-01 DIAGNOSIS — E03.9 ACQUIRED HYPOTHYROIDISM: ICD-10-CM

## 2024-03-01 DIAGNOSIS — F07.81 POST CONCUSSION SYNDROME: ICD-10-CM

## 2024-03-01 PROCEDURE — 1036F TOBACCO NON-USER: CPT | Performed by: FAMILY MEDICINE

## 2024-03-01 PROCEDURE — G8420 CALC BMI NORM PARAMETERS: HCPCS | Performed by: FAMILY MEDICINE

## 2024-03-01 PROCEDURE — G0439 PPPS, SUBSEQ VISIT: HCPCS | Performed by: FAMILY MEDICINE

## 2024-03-01 PROCEDURE — 99214 OFFICE O/P EST MOD 30 MIN: CPT | Performed by: FAMILY MEDICINE

## 2024-03-01 PROCEDURE — G8427 DOCREV CUR MEDS BY ELIG CLIN: HCPCS | Performed by: FAMILY MEDICINE

## 2024-03-01 PROCEDURE — 3017F COLORECTAL CA SCREEN DOC REV: CPT | Performed by: FAMILY MEDICINE

## 2024-03-01 PROCEDURE — G8484 FLU IMMUNIZE NO ADMIN: HCPCS | Performed by: FAMILY MEDICINE

## 2024-03-01 PROCEDURE — 1123F ACP DISCUSS/DSCN MKR DOCD: CPT | Performed by: FAMILY MEDICINE

## 2024-03-01 RX ORDER — EZETIMIBE 10 MG/1
10 TABLET ORAL DAILY
Qty: 90 TABLET | Refills: 1 | Status: SHIPPED | OUTPATIENT
Start: 2024-03-01

## 2024-03-01 NOTE — PROGRESS NOTES
Chief Complaint   Patient presents with    Medicare AWV         \"Have you been to the ER, urgent care clinic since your last visit?  Hospitalized since your last visit?\"    NO    “Have you seen or consulted any other health care providers outside of [facility] since your last visit?”    Dr van eye     “Have you had a colorectal cancer screening such as a colonoscopy/FIT/Cologuard?    NO         Health Maintenance Due   Topic Date Due    COVID-19 Vaccine (1) Never done    DTaP/Tdap/Td vaccine (1 - Tdap) Never done    Colorectal Cancer Screen  Never done    Shingles vaccine (1 of 2) Never done    Respiratory Syncytial Virus (RSV) Pregnant or age 60 yrs+ (1 - 1-dose 60+ series) Never done    Pneumococcal 65+ years Vaccine (1 - PCV) Never done    AAA screen  Never done    Flu vaccine (1) 08/01/2023    Annual Wellness Visit (Medicare Advantage)  Never done

## 2024-03-01 NOTE — PATIENT INSTRUCTIONS
doctor if you are having problems. It's also a good idea to know your test results and keep a list of the medicines you take.  How can you care for yourself at home?  Diet    Use less salt when you cook and eat. This helps lower your blood pressure. Taste food before salting. Add only a little salt when you think you need it. With time, your taste buds will adjust to less salt.     Eat fewer snack items, fast foods, canned soups, and other high-salt, high-fat, processed foods.     Read food labels and try to avoid saturated and trans fats. They increase your risk of heart disease by raising cholesterol levels.     Limit the amount of solid fat-butter, margarine, and shortening-you eat. Use olive, peanut, or canola oil when you cook. Bake, broil, and steam foods instead of frying them.     Eat a variety of fruit and vegetables every day. Dark green, deep orange, red, or yellow fruits and vegetables are especially good for you. Examples include spinach, carrots, peaches, and berries.     Foods high in fiber can reduce your cholesterol and provide important vitamins and minerals. High-fiber foods include whole-grain cereals and breads, oatmeal, beans, brown rice, citrus fruits, and apples.     Eat lean proteins. Heart-healthy proteins include seafood, lean meats and poultry, eggs, beans, peas, nuts, seeds, and soy products.     Limit drinks and foods with added sugar. These include candy, desserts, and soda pop.   Lifestyle changes    If your doctor recommends it, get more exercise. Walking is a good choice. Bit by bit, increase the amount you walk every day. Try for at least 30 minutes on most days of the week. You also may want to swim, bike, or do other activities.     Do not smoke. If you need help quitting, talk to your doctor about stop-smoking programs and medicines. These can increase your chances of quitting for good. Quitting smoking may be the most important step you can take to protect your heart. It is

## 2024-03-02 NOTE — PROGRESS NOTES
Progress Note    Patient: Kyler Melendez MRN: [id number]  SSN: [id number]    YOB: 1950  Age: 74 y.o.  Sex: male        Chief Complaint   Patient presents with    Medicare AWV     he is a 74 y.o. year old male who presents for follow up of chronic health conditions. He has had post concussion symptoms since fall from horse. These have improved. He has had appointment with neurology. He continues with LBP and requests referral back to PT.     Encounter Diagnoses   Name Primary?    Chronic midline low back pain without sciatica Yes    Post concussion syndrome     Pure hypercholesterolemia, unspecified     Acquired hypothyroidism     Medicare annual wellness visit, subsequent        BP Readings from Last 3 Encounters:   03/01/24 131/81   02/08/24 118/80   11/10/23 133/68     Wt Readings from Last 3 Encounters:   03/01/24 78.3 kg (172 lb 9.6 oz)   02/08/24 76.3 kg (168 lb 3.2 oz)   11/10/23 75.8 kg (167 lb)     Body mass index is 24.07 kg/m².    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

## 2024-03-22 RX ORDER — PRAVASTATIN SODIUM 40 MG
TABLET ORAL NIGHTLY
Qty: 90 TABLET | Refills: 1 | Status: SHIPPED | OUTPATIENT
Start: 2024-03-22

## 2024-04-18 RX ORDER — PRAVASTATIN SODIUM 40 MG
TABLET ORAL NIGHTLY
Qty: 90 TABLET | Refills: 1 | OUTPATIENT
Start: 2024-04-18

## 2024-07-13 DIAGNOSIS — E03.9 ACQUIRED HYPOTHYROIDISM: ICD-10-CM

## 2024-07-15 RX ORDER — LEVOTHYROXINE, LIOTHYRONINE 57; 13.5 UG/1; UG/1
90 TABLET ORAL DAILY
Qty: 90 TABLET | Refills: 1 | OUTPATIENT
Start: 2024-07-15

## 2024-07-17 RX ORDER — THYROID 90 MG/1
90 TABLET ORAL DAILY
Qty: 90 TABLET | Refills: 1 | Status: SHIPPED | OUTPATIENT
Start: 2024-07-17

## 2024-08-30 DIAGNOSIS — M54.50 CHRONIC MIDLINE LOW BACK PAIN WITHOUT SCIATICA: ICD-10-CM

## 2024-08-30 DIAGNOSIS — G89.29 CHRONIC MIDLINE LOW BACK PAIN WITHOUT SCIATICA: ICD-10-CM

## 2024-09-16 ENCOUNTER — OFFICE VISIT (OUTPATIENT)
Facility: CLINIC | Age: 74
End: 2024-09-16
Payer: MEDICARE

## 2024-09-16 VITALS
HEIGHT: 71 IN | DIASTOLIC BLOOD PRESSURE: 75 MMHG | SYSTOLIC BLOOD PRESSURE: 134 MMHG | TEMPERATURE: 98.1 F | BODY MASS INDEX: 24.22 KG/M2 | RESPIRATION RATE: 18 BRPM | WEIGHT: 173 LBS | HEART RATE: 67 BPM | OXYGEN SATURATION: 100 %

## 2024-09-16 DIAGNOSIS — R53.83 MALAISE AND FATIGUE: Primary | ICD-10-CM

## 2024-09-16 DIAGNOSIS — R53.81 MALAISE AND FATIGUE: Primary | ICD-10-CM

## 2024-09-16 PROCEDURE — 1123F ACP DISCUSS/DSCN MKR DOCD: CPT

## 2024-09-16 PROCEDURE — G8427 DOCREV CUR MEDS BY ELIG CLIN: HCPCS

## 2024-09-16 PROCEDURE — 3017F COLORECTAL CA SCREEN DOC REV: CPT

## 2024-09-16 PROCEDURE — 99214 OFFICE O/P EST MOD 30 MIN: CPT

## 2024-09-16 PROCEDURE — G8420 CALC BMI NORM PARAMETERS: HCPCS

## 2024-09-16 PROCEDURE — 1036F TOBACCO NON-USER: CPT

## 2024-09-16 ASSESSMENT — ENCOUNTER SYMPTOMS
SHORTNESS OF BREATH: 0
VOMITING: 0
DIARRHEA: 0
BLOOD IN STOOL: 0
NAUSEA: 0

## 2024-09-17 DIAGNOSIS — R53.81 MALAISE AND FATIGUE: ICD-10-CM

## 2024-09-17 DIAGNOSIS — R53.83 MALAISE AND FATIGUE: Primary | ICD-10-CM

## 2024-09-17 DIAGNOSIS — R53.83 MALAISE AND FATIGUE: ICD-10-CM

## 2024-09-17 DIAGNOSIS — R53.81 MALAISE AND FATIGUE: Primary | ICD-10-CM

## 2024-09-18 LAB
ALBUMIN SERPL-MCNC: 4.1 G/DL (ref 3.5–5)
ALBUMIN/GLOB SERPL: 1.5 (ref 1.1–2.2)
ALP SERPL-CCNC: 48 U/L (ref 45–117)
ALT SERPL-CCNC: 34 U/L (ref 12–78)
ANION GAP SERPL CALC-SCNC: 5 MMOL/L (ref 2–12)
AST SERPL-CCNC: 32 U/L (ref 15–37)
BASOPHILS # BLD: 0 K/UL (ref 0–0.1)
BASOPHILS NFR BLD: 0 % (ref 0–1)
BILIRUB SERPL-MCNC: 0.4 MG/DL (ref 0.2–1)
BUN SERPL-MCNC: 20 MG/DL (ref 6–20)
BUN/CREAT SERPL: 18 (ref 12–20)
CALCIUM SERPL-MCNC: 8.8 MG/DL (ref 8.5–10.1)
CHLORIDE SERPL-SCNC: 105 MMOL/L (ref 97–108)
CK SERPL-CCNC: 277 U/L (ref 39–308)
CO2 SERPL-SCNC: 26 MMOL/L (ref 21–32)
CREAT SERPL-MCNC: 1.1 MG/DL (ref 0.7–1.3)
CRP SERPL-MCNC: <0.29 MG/DL (ref 0–0.3)
DIFFERENTIAL METHOD BLD: ABNORMAL
EOSINOPHIL # BLD: 0 K/UL (ref 0–0.4)
EOSINOPHIL NFR BLD: 0 % (ref 0–7)
ERYTHROCYTE [DISTWIDTH] IN BLOOD BY AUTOMATED COUNT: 12.6 % (ref 11.5–14.5)
ERYTHROCYTE [SEDIMENTATION RATE] IN BLOOD: 2 MM/HR (ref 0–20)
GLOBULIN SER CALC-MCNC: 2.7 G/DL (ref 2–4)
GLUCOSE SERPL-MCNC: 128 MG/DL (ref 65–100)
HCT VFR BLD AUTO: 41.4 % (ref 36.6–50.3)
HGB BLD-MCNC: 13.1 G/DL (ref 12.1–17)
HIV 1+2 AB+HIV1 P24 AG SERPL QL IA: NONREACTIVE
HIV 1/2 RESULT COMMENT: NORMAL
IMM GRANULOCYTES # BLD AUTO: 0 K/UL (ref 0–0.04)
IMM GRANULOCYTES NFR BLD AUTO: 0 % (ref 0–0.5)
LYMPHOCYTES # BLD: 0.8 K/UL (ref 0.8–3.5)
LYMPHOCYTES NFR BLD: 15 % (ref 12–49)
MCH RBC QN AUTO: 30.8 PG (ref 26–34)
MCHC RBC AUTO-ENTMCNC: 31.6 G/DL (ref 30–36.5)
MCV RBC AUTO: 97.2 FL (ref 80–99)
MONOCYTES # BLD: 0.8 K/UL (ref 0–1)
MONOCYTES NFR BLD: 15 % (ref 5–13)
NEUTS SEG # BLD: 3.5 K/UL (ref 1.8–8)
NEUTS SEG NFR BLD: 70 % (ref 32–75)
NRBC # BLD: 0 K/UL (ref 0–0.01)
NRBC BLD-RTO: 0 PER 100 WBC
PLATELET # BLD AUTO: 158 K/UL (ref 150–400)
PMV BLD AUTO: 11.8 FL (ref 8.9–12.9)
POTASSIUM SERPL-SCNC: 4.6 MMOL/L (ref 3.5–5.1)
PROT SERPL-MCNC: 6.8 G/DL (ref 6.4–8.2)
RBC # BLD AUTO: 4.26 M/UL (ref 4.1–5.7)
RBC MORPH BLD: ABNORMAL
SODIUM SERPL-SCNC: 136 MMOL/L (ref 136–145)
TSH SERPL DL<=0.05 MIU/L-ACNC: 0.38 UIU/ML (ref 0.36–3.74)
WBC # BLD AUTO: 5.1 K/UL (ref 4.1–11.1)

## 2024-09-19 LAB — LYME ANTIBODY: NEGATIVE

## 2024-10-07 ENCOUNTER — OFFICE VISIT (OUTPATIENT)
Facility: CLINIC | Age: 74
End: 2024-10-07
Payer: MEDICARE

## 2024-10-07 VITALS
BODY MASS INDEX: 24.08 KG/M2 | SYSTOLIC BLOOD PRESSURE: 122 MMHG | TEMPERATURE: 98 F | OXYGEN SATURATION: 99 % | RESPIRATION RATE: 18 BRPM | WEIGHT: 172 LBS | HEART RATE: 67 BPM | DIASTOLIC BLOOD PRESSURE: 68 MMHG | HEIGHT: 71 IN

## 2024-10-07 DIAGNOSIS — E78.5 DYSLIPIDEMIA: ICD-10-CM

## 2024-10-07 DIAGNOSIS — R53.83 MALAISE AND FATIGUE: Primary | ICD-10-CM

## 2024-10-07 DIAGNOSIS — R53.81 MALAISE AND FATIGUE: Primary | ICD-10-CM

## 2024-10-07 DIAGNOSIS — R09.81 NASAL SINUS CONGESTION: ICD-10-CM

## 2024-10-07 DIAGNOSIS — H91.8X3 OTHER SPECIFIED HEARING LOSS OF BOTH EARS: ICD-10-CM

## 2024-10-07 DIAGNOSIS — E03.9 ACQUIRED HYPOTHYROIDISM: ICD-10-CM

## 2024-10-07 DIAGNOSIS — T88.7XXA SIDE EFFECT OF MEDICATION: ICD-10-CM

## 2024-10-07 PROCEDURE — 99214 OFFICE O/P EST MOD 30 MIN: CPT

## 2024-10-07 PROCEDURE — G8427 DOCREV CUR MEDS BY ELIG CLIN: HCPCS

## 2024-10-07 PROCEDURE — 1036F TOBACCO NON-USER: CPT

## 2024-10-07 PROCEDURE — G8420 CALC BMI NORM PARAMETERS: HCPCS

## 2024-10-07 PROCEDURE — G8484 FLU IMMUNIZE NO ADMIN: HCPCS

## 2024-10-07 PROCEDURE — 3017F COLORECTAL CA SCREEN DOC REV: CPT

## 2024-10-07 PROCEDURE — 1123F ACP DISCUSS/DSCN MKR DOCD: CPT

## 2024-10-07 ASSESSMENT — ENCOUNTER SYMPTOMS
BLOOD IN STOOL: 0
SHORTNESS OF BREATH: 0
ABDOMINAL PAIN: 0
SINUS PAIN: 0

## 2024-10-07 NOTE — PROGRESS NOTES
I saw and evaluated the patient, performing the key elements of the service on the date of service.  I discussed the findings, assessment and plan with the resident and agree with the resident's findings and plan as documented in the resident's note.     Becky Wright MD 10/7/2024

## 2024-10-07 NOTE — PROGRESS NOTES
[address] [place], [place] [address]  Tel: [phone]   Subjective   Kyler Melendez is a 74 y.o. male who presents for Fatigue    Patient was seen about 3 weeks ago for evaluation of body aches and weakness most notable in the thighs. CBC, CMP, CK, ESR, CRP all within the normal visit. TSH on the now end of normal limit, below baseline. Lyme and HIV testing negative.     Today he reports that he is feeling much improved since stopping some of his medications. He stopped the pravastatin and zetia about 2 weeks ago and since then the cramping has resolved. He also reports a tooth infection was diagnosed and treated with penicillin which helped with the fatigue as well. However, now he is experiencing congestion and sneezing.      Review of Systems   Review of Systems   Constitutional:  Negative for chills, fatigue and fever.   HENT:  Positive for congestion and hearing loss. Negative for sinus pain.    Respiratory:  Negative for shortness of breath.    Cardiovascular:  Negative for chest pain.   Gastrointestinal:  Negative for abdominal pain and blood in stool.     Medical History  Past Medical History:   Diagnosis Date    Atherosclerosis     Concussion 05/05/2023    multiple over the years    Depression     Dyslipidemia     Elevated coronary artery calcium score     Hearing loss     Getting much worse    Hypercholesterolemia     Skin cancer     Thyroid disease        Medications  Current Outpatient Medications   Medication Sig    diclofenac (VOLTAREN) 50 MG EC tablet Take 1 tablet by mouth 2 times daily as needed for Pain    thyroid (ARMOUR) 90 MG tablet Take 1 tablet by mouth daily (Patient taking differently: Take 1 tablet by mouth daily Taking every other day)    magnesium (MAGNESIUM-OXIDE) 250 MG TABS tablet Take 1 tablet by mouth every other day    NONFORMULARY Take 2 tablets by mouth daily Z Stack    NONFORMULARY Take 1 capsule by mouth 2 times daily NAC    Menaquinone-7 (VITAMIN K2 PO) Take 1

## 2024-10-08 DIAGNOSIS — E78.00 PURE HYPERCHOLESTEROLEMIA, UNSPECIFIED: ICD-10-CM

## 2024-10-08 DIAGNOSIS — E78.5 DYSLIPIDEMIA: ICD-10-CM

## 2024-10-09 LAB
CHOLEST SERPL-MCNC: 212 MG/DL
ERYTHROCYTE [DISTWIDTH] IN BLOOD BY AUTOMATED COUNT: 12.3 % (ref 11.5–14.5)
HCT VFR BLD AUTO: 42.3 % (ref 36.6–50.3)
HDLC SERPL-MCNC: 49 MG/DL
HDLC SERPL: 4.3 (ref 0–5)
HGB BLD-MCNC: 13.7 G/DL (ref 12.1–17)
LDLC SERPL CALC-MCNC: 144.6 MG/DL (ref 0–100)
MCH RBC QN AUTO: 31.3 PG (ref 26–34)
MCHC RBC AUTO-ENTMCNC: 32.4 G/DL (ref 30–36.5)
MCV RBC AUTO: 96.6 FL (ref 80–99)
NRBC # BLD: 0 K/UL (ref 0–0.01)
NRBC BLD-RTO: 0 PER 100 WBC
PLATELET # BLD AUTO: 213 K/UL (ref 150–400)
PMV BLD AUTO: 11.3 FL (ref 8.9–12.9)
RBC # BLD AUTO: 4.38 M/UL (ref 4.1–5.7)
TRIGL SERPL-MCNC: 92 MG/DL
VLDLC SERPL CALC-MCNC: 18.4 MG/DL
WBC # BLD AUTO: 5.8 K/UL (ref 4.1–11.1)

## 2024-11-04 RX ORDER — PRAVASTATIN SODIUM 40 MG
TABLET ORAL NIGHTLY
Qty: 90 TABLET | Refills: 1 | OUTPATIENT
Start: 2024-11-04

## 2025-01-19 DIAGNOSIS — G89.29 CHRONIC MIDLINE LOW BACK PAIN WITHOUT SCIATICA: ICD-10-CM

## 2025-01-19 DIAGNOSIS — M54.50 CHRONIC MIDLINE LOW BACK PAIN WITHOUT SCIATICA: ICD-10-CM

## 2025-02-10 ENCOUNTER — OFFICE VISIT (OUTPATIENT)
Age: 75
End: 2025-02-10
Payer: COMMERCIAL

## 2025-02-10 VITALS
DIASTOLIC BLOOD PRESSURE: 60 MMHG | BODY MASS INDEX: 26.04 KG/M2 | WEIGHT: 186 LBS | HEART RATE: 77 BPM | OXYGEN SATURATION: 98 % | SYSTOLIC BLOOD PRESSURE: 138 MMHG | HEIGHT: 71 IN

## 2025-02-10 DIAGNOSIS — I70.90 ATHEROSCLEROSIS: ICD-10-CM

## 2025-02-10 DIAGNOSIS — I25.118 CORONARY ARTERY DISEASE OF NATIVE ARTERY OF NATIVE HEART WITH STABLE ANGINA PECTORIS (HCC): Primary | ICD-10-CM

## 2025-02-10 DIAGNOSIS — E78.5 DYSLIPIDEMIA: ICD-10-CM

## 2025-02-10 PROCEDURE — 1123F ACP DISCUSS/DSCN MKR DOCD: CPT | Performed by: SPECIALIST

## 2025-02-10 PROCEDURE — 93000 ELECTROCARDIOGRAM COMPLETE: CPT | Performed by: SPECIALIST

## 2025-02-10 PROCEDURE — 99214 OFFICE O/P EST MOD 30 MIN: CPT | Performed by: SPECIALIST

## 2025-02-10 NOTE — PROGRESS NOTES
Chief Complaint   Patient presents with    Annual Exam    Coronary Artery Disease    Hyperlipidemia     Vitals:    02/10/25 1324   BP: 138/60   Site: Left Upper Arm   Position: Sitting   Cuff Size: Medium Adult   Pulse: 77   SpO2: 98%   Weight: 84.4 kg (186 lb)   Height: 1.803 m (5' 11\")       Chest pain NO     ER, urgent care, or hospitalized outside of [facility] since your last visit?  NO     Refills NO

## 2025-02-10 NOTE — PROGRESS NOTES
Patient: Kyler Melendez  : 1950    Primary Cardiologist: Nikki Beal MD. [facility]      Today's Date: 2/10/2025    HISTORY OF PRESENT ILLNESS:     History of Present Illness:    Slowing down a little bit - tired at the end of the day of work.  Eating more meat and weight is up a 5 lbs.  He feels stronger.   Denies CP. Continues with Class 1-2 DAMICO.  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       PAST MEDICAL HISTORY:     Past Medical History:   Diagnosis Date    Atherosclerosis     Concussion 2023    multiple over the years    Depression     Dyslipidemia     Elevated coronary artery calcium score     Hearing loss     Getting much worse    Hypercholesterolemia     Skin cancer     Thyroid disease        Past Surgical History:   Procedure Laterality Date    HEENT      subcutaneous cyst removed from head    HEENT      skin cancer removed from face    TONSILLECTOMY AND ADENOIDECTOMY         CURRENT MEDICATIONS:      Current Outpatient Medications   Medication Sig Dispense Refill    diclofenac (VOLTAREN) 50 MG EC tablet TAKE ONE TABLET BY MOUTH TWICE DAILY AS NEEDED FOR pain 90 tablet 0    thyroid (ARMOUR) 90 MG tablet Take 1 tablet by mouth daily (Patient taking differently: Take 0.5 tablets by mouth daily) 90 tablet 1    ezetimibe (ZETIA) 10 MG tablet Take 1 tablet by mouth daily 90 tablet 1    magnesium (MAGNESIUM-OXIDE) 250 MG TABS tablet Take 1 tablet by mouth every other day Mag-Adrian-Zinc      NONFORMULARY Take 2 tablets by mouth daily Z Stack      NONFORMULARY Take 1 capsule by mouth 2 times daily NAC      Menaquinone-7 (VITAMIN K2 PO) Take 1 capsule by mouth nightly      Nattokinase 100 MG CAPS Take 2 capsules by mouth daily      NONFORMULARY Take 1 capsule by mouth daily Gopi Red      coenzyme Q10 200 MG CAPS capsule Take 2 capsules by mouth daily       No current facility-administered medications for this visit.       Allergies   Allergen Reactions    Atorvastatin Myalgia    Ezetimibe Myalgia

## 2025-02-27 DIAGNOSIS — M54.50 CHRONIC MIDLINE LOW BACK PAIN WITHOUT SCIATICA: ICD-10-CM

## 2025-02-27 DIAGNOSIS — G89.29 CHRONIC MIDLINE LOW BACK PAIN WITHOUT SCIATICA: ICD-10-CM

## 2025-04-18 DIAGNOSIS — E78.00 PURE HYPERCHOLESTEROLEMIA, UNSPECIFIED: ICD-10-CM

## 2025-04-18 RX ORDER — EZETIMIBE 10 MG/1
10 TABLET ORAL DAILY
Qty: 90 TABLET | Refills: 1 | Status: SHIPPED | OUTPATIENT
Start: 2025-04-18

## 2025-05-01 DIAGNOSIS — E78.00 PURE HYPERCHOLESTEROLEMIA, UNSPECIFIED: ICD-10-CM

## 2025-05-01 RX ORDER — EZETIMIBE 10 MG/1
10 TABLET ORAL DAILY
Qty: 90 TABLET | Refills: 1 | OUTPATIENT
Start: 2025-05-01

## 2025-06-05 DIAGNOSIS — M54.50 CHRONIC MIDLINE LOW BACK PAIN WITHOUT SCIATICA: ICD-10-CM

## 2025-06-05 DIAGNOSIS — G89.29 CHRONIC MIDLINE LOW BACK PAIN WITHOUT SCIATICA: ICD-10-CM

## 2025-06-05 DIAGNOSIS — E03.9 ACQUIRED HYPOTHYROIDISM: ICD-10-CM

## 2025-06-05 RX ORDER — THYROID 90 MG/1
90 TABLET ORAL DAILY
Qty: 90 TABLET | Refills: 1 | Status: SHIPPED | OUTPATIENT
Start: 2025-06-05

## 2025-07-09 NOTE — PROGRESS NOTES
[facility]  [address] [place] [address]  Tel: [phone]  Fax: [phone]    Subjective:   Kyler Melendez is an 75 y.o. male who presents for complete physical exam.    Issues today include   - Right index finger pain at the base. 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 Denies any acute injury. Has tried voltaren topical and oral without much relief.   - Dupuytrens of left 5th finger. Says that the contraction is starting to get worse and is interested in treatment.     Chronic Medical Conditions  Hypothyroid - on armor thyroid. TSH 0.38 in September.   - Notes cold intolerance and fatigue    CAD/HLD  - Intolerant to Statin.   - Continued on Zetia.   - Discussed PCSK9 inhibitor with cardiologist but pt has not read up on this medication yet.     Diet: Well balanced with meats, fruits and vegetables. Tries to avoid sugary foods.   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   Tobacco use: Not smoking.   Alcohol use: No     Health Maintenance   Topic Date Due    AAA screen  Never done    DTaP/Tdap/Td vaccine (1 - Tdap) 07/10/2026 (Originally 1/23/1969)    Shingles vaccine (1 of 2) 07/10/2026 (Originally 1/23/2002)    Pneumococcal 50+ years Vaccine (1 of 1 - PCV) 07/10/2026 (Originally 1/23/2002)    COVID-19 Vaccine (1 - 2024-25 season) 07/10/2026 (Originally 9/1/2024)    Flu vaccine (1) 08/01/2025    Depression Screen  07/10/2026    Respiratory Syncytial Virus (RSV) Pregnant or age 60 yrs+ (1 - 1-dose 75+ series) 01/23/2027    Lipids  10/08/2029    Colorectal Cancer Screen  10/15/2030    Hepatitis C screen  Completed    Hepatitis A vaccine  Aged Out    Hepatitis B vaccine  Aged Out    Hib vaccine  Aged Out    Polio vaccine  Aged Out    Meningococcal (ACWY) vaccine  Aged Out    Meningococcal B vaccine  Aged Out       Immunizations, reviewed:

## 2025-07-10 ENCOUNTER — OFFICE VISIT (OUTPATIENT)
Facility: CLINIC | Age: 75
End: 2025-07-10
Payer: COMMERCIAL

## 2025-07-10 VITALS
BODY MASS INDEX: 24.22 KG/M2 | HEART RATE: 61 BPM | SYSTOLIC BLOOD PRESSURE: 115 MMHG | HEIGHT: 71 IN | TEMPERATURE: 98 F | OXYGEN SATURATION: 98 % | RESPIRATION RATE: 18 BRPM | DIASTOLIC BLOOD PRESSURE: 69 MMHG | WEIGHT: 173 LBS

## 2025-07-10 DIAGNOSIS — Z13.6 SCREENING FOR AAA (ABDOMINAL AORTIC ANEURYSM): ICD-10-CM

## 2025-07-10 DIAGNOSIS — M79.644 FINGER PAIN, RIGHT: ICD-10-CM

## 2025-07-10 DIAGNOSIS — Z87.891 PERSONAL HISTORY OF TOBACCO USE, PRESENTING HAZARDS TO HEALTH: ICD-10-CM

## 2025-07-10 DIAGNOSIS — E03.9 ACQUIRED HYPOTHYROIDISM: ICD-10-CM

## 2025-07-10 DIAGNOSIS — Z00.00 WELL ADULT EXAM: Primary | ICD-10-CM

## 2025-07-10 DIAGNOSIS — I25.10 CORONARY ARTERY DISEASE INVOLVING NATIVE CORONARY ARTERY OF NATIVE HEART WITHOUT ANGINA PECTORIS: ICD-10-CM

## 2025-07-10 DIAGNOSIS — M72.0 DUPUYTREN CONTRACTURE: ICD-10-CM

## 2025-07-10 PROCEDURE — 99397 PER PM REEVAL EST PAT 65+ YR: CPT

## 2025-07-10 PROCEDURE — 99213 OFFICE O/P EST LOW 20 MIN: CPT

## 2025-07-10 RX ORDER — CAFFEINE 200 MG
200 TABLET ORAL EVERY 4 HOURS PRN
COMMUNITY

## 2025-07-10 SDOH — ECONOMIC STABILITY: FOOD INSECURITY: WITHIN THE PAST 12 MONTHS, YOU WORRIED THAT YOUR FOOD WOULD RUN OUT BEFORE YOU GOT MONEY TO BUY MORE.: NEVER TRUE

## 2025-07-10 SDOH — ECONOMIC STABILITY: FOOD INSECURITY: WITHIN THE PAST 12 MONTHS, THE FOOD YOU BOUGHT JUST DIDN'T LAST AND YOU DIDN'T HAVE MONEY TO GET MORE.: NEVER TRUE

## 2025-07-10 ASSESSMENT — PATIENT HEALTH QUESTIONNAIRE - PHQ9
8. MOVING OR SPEAKING SO SLOWLY THAT OTHER PEOPLE COULD HAVE NOTICED. OR THE OPPOSITE, BEING SO FIGETY OR RESTLESS THAT YOU HAVE BEEN MOVING AROUND A LOT MORE THAN USUAL: NOT AT ALL
4. FEELING TIRED OR HAVING LITTLE ENERGY: NOT AT ALL
2. FEELING DOWN, DEPRESSED OR HOPELESS: NOT AT ALL
SUM OF ALL RESPONSES TO PHQ QUESTIONS 1-9: 0
9. THOUGHTS THAT YOU WOULD BE BETTER OFF DEAD, OR OF HURTING YOURSELF: NOT AT ALL
1. LITTLE INTEREST OR PLEASURE IN DOING THINGS: NOT AT ALL
3. TROUBLE FALLING OR STAYING ASLEEP: NOT AT ALL
SUM OF ALL RESPONSES TO PHQ QUESTIONS 1-9: 0
SUM OF ALL RESPONSES TO PHQ QUESTIONS 1-9: 0
7. TROUBLE CONCENTRATING ON THINGS, SUCH AS READING THE NEWSPAPER OR WATCHING TELEVISION: NOT AT ALL
5. POOR APPETITE OR OVEREATING: NOT AT ALL
10. IF YOU CHECKED OFF ANY PROBLEMS, HOW DIFFICULT HAVE THESE PROBLEMS MADE IT FOR YOU TO DO YOUR WORK, TAKE CARE OF THINGS AT HOME, OR GET ALONG WITH OTHER PEOPLE: NOT DIFFICULT AT ALL
SUM OF ALL RESPONSES TO PHQ QUESTIONS 1-9: 0
6. FEELING BAD ABOUT YOURSELF - OR THAT YOU ARE A FAILURE OR HAVE LET YOURSELF OR YOUR FAMILY DOWN: NOT AT ALL

## 2025-07-10 ASSESSMENT — ENCOUNTER SYMPTOMS
ABDOMINAL PAIN: 0
VOMITING: 0
CONSTIPATION: 0
SHORTNESS OF BREATH: 0
NAUSEA: 0

## 2025-07-10 NOTE — PROGRESS NOTES
I reviewed with the resident the medical history and the resident's findings on the physical examination.  I discussed with the resident the patient's diagnosis and concur with the plan.     Becky Wright MD 7/10/2025

## 2025-07-10 NOTE — PROGRESS NOTES
Chief Complaint   Patient presents with    Annual Exam         \"Have you been to the ER, urgent care clinic since your last visit?  Hospitalized since your last visit?\"    NO    “Have you seen or consulted any other health care providers outside of [facility] since your last visit?”    NO            Click Here for Release of Records Request     Health Maintenance Due   Topic Date Due    DTaP/Tdap/Td vaccine (1 - Tdap) Never done    Shingles vaccine (1 of 2) Never done    Pneumococcal 50+ years Vaccine (1 of 1 - PCV) Never done    AAA screen  Never done    COVID-19 Vaccine (1 - 2024-25 season) Never done    Depression Screen  02/27/2025

## 2025-07-12 LAB
CHOLEST SERPL-MCNC: 169 MG/DL
HDLC SERPL-MCNC: 56 MG/DL
HDLC SERPL: 3 (ref 0–5)
LDLC SERPL CALC-MCNC: 102.2 MG/DL (ref 0–100)
TRIGL SERPL-MCNC: 54 MG/DL
TSH SERPL DL<=0.05 MIU/L-ACNC: 2.2 UIU/ML (ref 0.36–3.74)
VLDLC SERPL CALC-MCNC: 10.8 MG/DL

## (undated) DEVICE — SUPPORT WRST AD W3.5XL9IN DIA14.5IN ART SFT ADJ HK AND LOOP

## (undated) DEVICE — RADIFOCUS OPTITORQUE ANGIOGRAPHIC CATHETER: Brand: OPTITORQUE

## (undated) DEVICE — GUIDEWIRE VASC L180CM DIA0.035IN 3MM PTFE J TIP EXCHG FIX

## (undated) DEVICE — GLIDESHEATH SLENDER STAINLESS STEEL KIT: Brand: GLIDESHEATH SLENDER

## (undated) DEVICE — TUBING PRSS MON L12IN PVC RIG NONEXPANDING M TO FEM CONN

## (undated) DEVICE — TR BAND RADIAL ARTERY COMPRESSION DEVICE: Brand: TR BAND

## (undated) DEVICE — CATH 5F 100CM JL35 -- DXTERITY

## (undated) DEVICE — CATH GUID COR JL3.5 6FR 100CM -- LAUNCHER

## (undated) DEVICE — Device: Brand: OMNIWIRE PRESSURE GUIDE WIRE

## (undated) DEVICE — HEART CATH-SFMC: Brand: MEDLINE INDUSTRIES, INC.